# Patient Record
Sex: MALE | Race: WHITE | NOT HISPANIC OR LATINO | Employment: OTHER | URBAN - METROPOLITAN AREA
[De-identification: names, ages, dates, MRNs, and addresses within clinical notes are randomized per-mention and may not be internally consistent; named-entity substitution may affect disease eponyms.]

---

## 2023-05-31 LAB
ALANINE AMINOTRANSFERASE (SGPT) (U/L) IN SER/PLAS: 21 U/L (ref 10–52)
ALBUMIN (G/DL) IN SER/PLAS: 4.1 G/DL (ref 3.4–5)
ALKALINE PHOSPHATASE (U/L) IN SER/PLAS: 74 U/L (ref 33–136)
ANION GAP IN SER/PLAS: 10 MMOL/L (ref 10–20)
ASPARTATE AMINOTRANSFERASE (SGOT) (U/L) IN SER/PLAS: 26 U/L (ref 9–39)
BILIRUBIN TOTAL (MG/DL) IN SER/PLAS: 0.7 MG/DL (ref 0–1.2)
CALCIUM (MG/DL) IN SER/PLAS: 9.5 MG/DL (ref 8.6–10.3)
CARBON DIOXIDE, TOTAL (MMOL/L) IN SER/PLAS: 30 MMOL/L (ref 21–32)
CHLORIDE (MMOL/L) IN SER/PLAS: 106 MMOL/L (ref 98–107)
CREATININE (MG/DL) IN SER/PLAS: 1.29 MG/DL (ref 0.5–1.3)
GFR MALE: 56 ML/MIN/1.73M2
GLUCOSE (MG/DL) IN SER/PLAS: 90 MG/DL (ref 74–99)
POTASSIUM (MMOL/L) IN SER/PLAS: 4.1 MMOL/L (ref 3.5–5.3)
PROTEIN TOTAL: 7.2 G/DL (ref 6.4–8.2)
SODIUM (MMOL/L) IN SER/PLAS: 142 MMOL/L (ref 136–145)
UREA NITROGEN (MG/DL) IN SER/PLAS: 21 MG/DL (ref 6–23)

## 2023-06-09 LAB — N-TELOPEPTIDE,SERUM: 10.4 NM BCE (ref 5.4–24.2)

## 2023-07-18 ENCOUNTER — TELEPHONE (OUTPATIENT)
Dept: PRIMARY CARE | Facility: CLINIC | Age: 83
End: 2023-07-18
Payer: MEDICARE

## 2023-07-18 NOTE — TELEPHONE ENCOUNTER
Pt of Dr Casanova. Has appt on 8/8 and wife called in asking if labs were needed before. Please advise he had some labs done recently through another doctor.

## 2023-07-19 PROBLEM — R29.898 WEAKNESS OF SHOULDER: Status: ACTIVE | Noted: 2023-07-19

## 2023-07-19 PROBLEM — D12.6 ADENOMATOUS COLON POLYP: Status: ACTIVE | Noted: 2023-07-19

## 2023-07-19 PROBLEM — E78.5 DYSLIPIDEMIA: Status: ACTIVE | Noted: 2023-07-19

## 2023-07-19 PROBLEM — F41.9 ANXIOUSNESS: Status: ACTIVE | Noted: 2023-07-19

## 2023-07-19 PROBLEM — R79.89 ELEVATED SERUM CREATININE: Status: ACTIVE | Noted: 2023-07-19

## 2023-07-19 PROBLEM — C61 PROSTATE CANCER (MULTI): Status: ACTIVE | Noted: 2023-07-19

## 2023-07-19 PROBLEM — M81.0 OSTEOPOROSIS WITHOUT CURRENT PATHOLOGICAL FRACTURE: Status: ACTIVE | Noted: 2023-07-19

## 2023-07-19 PROBLEM — R60.9 EDEMA: Status: ACTIVE | Noted: 2023-07-19

## 2023-07-19 PROBLEM — N18.31 CHRONIC KIDNEY DISEASE, STAGE 3A (MULTI): Status: ACTIVE | Noted: 2023-07-19

## 2023-07-19 PROBLEM — I73.00 RAYNAUDS PHENOMENON: Status: ACTIVE | Noted: 2023-07-19

## 2023-07-19 PROBLEM — R03.0 BLOOD PRESSURE ELEVATED WITHOUT HISTORY OF HTN: Status: ACTIVE | Noted: 2023-07-19

## 2023-07-19 PROBLEM — R41.89 COGNITIVE DECLINE: Status: ACTIVE | Noted: 2023-07-19

## 2023-07-19 PROBLEM — J06.9 ACUTE UPPER RESPIRATORY INFECTION: Status: ACTIVE | Noted: 2023-07-19

## 2023-07-19 PROBLEM — M25.512 PAIN IN JOINT OF LEFT SHOULDER: Status: ACTIVE | Noted: 2023-07-19

## 2023-07-19 PROBLEM — I83.899 VARICOSE VEINS OF LEG WITH SWELLING: Status: ACTIVE | Noted: 2023-07-19

## 2023-07-19 PROBLEM — R41.3 MEMORY CHANGES: Status: ACTIVE | Noted: 2023-07-19

## 2023-07-19 PROBLEM — M85.80 OSTEOPENIA: Status: ACTIVE | Noted: 2023-07-19

## 2023-07-19 PROBLEM — L81.8: Status: ACTIVE | Noted: 2023-07-19

## 2023-07-19 PROBLEM — E03.9 HYPOTHYROIDISM: Status: ACTIVE | Noted: 2023-07-19

## 2023-07-19 PROBLEM — F03.90 DEMENTIA WITHOUT BEHAVIORAL DISTURBANCE (MULTI): Status: ACTIVE | Noted: 2023-07-19

## 2023-07-19 PROBLEM — D64.9 ANEMIA: Status: ACTIVE | Noted: 2023-07-19

## 2023-07-19 PROBLEM — Z97.3 WEARS GLASSES: Status: ACTIVE | Noted: 2023-07-19

## 2023-07-19 RX ORDER — LEVOTHYROXINE SODIUM 50 UG/1
TABLET ORAL
COMMUNITY
Start: 2017-11-23 | End: 2023-08-10

## 2023-07-19 RX ORDER — ALENDRONATE SODIUM 70 MG/1
TABLET ORAL
COMMUNITY
Start: 2021-10-29 | End: 2024-02-12 | Stop reason: WASHOUT

## 2023-07-19 RX ORDER — ASPIRIN 81 MG/1
TABLET ORAL
COMMUNITY
Start: 2018-07-19 | End: 2024-02-12 | Stop reason: ALTCHOICE

## 2023-07-19 RX ORDER — DENOSUMAB 60 MG/ML
60 INJECTION SUBCUTANEOUS
COMMUNITY
Start: 2022-08-24 | End: 2023-11-08 | Stop reason: SDUPTHER

## 2023-07-19 RX ORDER — CALCIUM CARB/VITAMIN D3/VIT K1 500-500-40
TABLET,CHEWABLE ORAL
COMMUNITY
Start: 2017-03-15

## 2023-07-19 RX ORDER — HYDROXYZINE HYDROCHLORIDE 25 MG/1
1 TABLET, FILM COATED ORAL DAILY PRN
COMMUNITY
Start: 2023-02-20 | End: 2024-02-12 | Stop reason: SDUPTHER

## 2023-07-19 RX ORDER — MULTIVITAMIN
TABLET ORAL
COMMUNITY

## 2023-07-19 RX ORDER — CALCIUM CARBONATE 600 MG
1 TABLET ORAL 2 TIMES DAILY
COMMUNITY

## 2023-07-19 RX ORDER — DONEPEZIL HYDROCHLORIDE 10 MG/1
1 TABLET, FILM COATED ORAL NIGHTLY
COMMUNITY
Start: 2021-10-27 | End: 2023-11-24

## 2023-07-19 RX ORDER — ATORVASTATIN CALCIUM 20 MG/1
1 TABLET, FILM COATED ORAL DAILY
COMMUNITY
Start: 2017-07-30 | End: 2023-12-07

## 2023-08-08 ENCOUNTER — TELEPHONE (OUTPATIENT)
Dept: PRIMARY CARE | Facility: CLINIC | Age: 83
End: 2023-08-08

## 2023-08-08 ENCOUNTER — OFFICE VISIT (OUTPATIENT)
Dept: PRIMARY CARE | Facility: CLINIC | Age: 83
End: 2023-08-08
Payer: MEDICARE

## 2023-08-08 DIAGNOSIS — E03.9 ACQUIRED HYPOTHYROIDISM: Chronic | ICD-10-CM

## 2023-08-08 DIAGNOSIS — N18.31 CHRONIC KIDNEY DISEASE, STAGE 3A (MULTI): Chronic | ICD-10-CM

## 2023-08-08 DIAGNOSIS — C61 PROSTATE CANCER (MULTI): Chronic | ICD-10-CM

## 2023-08-08 DIAGNOSIS — E78.5 DYSLIPIDEMIA: Chronic | ICD-10-CM

## 2023-08-08 DIAGNOSIS — F03.B4 MODERATE DEMENTIA WITH ANXIETY, UNSPECIFIED DEMENTIA TYPE (MULTI): Chronic | ICD-10-CM

## 2023-08-08 DIAGNOSIS — D64.9 ANEMIA, UNSPECIFIED TYPE: Chronic | ICD-10-CM

## 2023-08-08 DIAGNOSIS — R03.0 BLOOD PRESSURE ELEVATED WITHOUT HISTORY OF HTN: Primary | Chronic | ICD-10-CM

## 2023-08-08 PROBLEM — R79.89 ELEVATED SERUM CREATININE: Status: RESOLVED | Noted: 2023-07-19 | Resolved: 2023-08-08

## 2023-08-08 PROCEDURE — 1126F AMNT PAIN NOTED NONE PRSNT: CPT | Performed by: INTERNAL MEDICINE

## 2023-08-08 PROCEDURE — 1160F RVW MEDS BY RX/DR IN RCRD: CPT | Performed by: INTERNAL MEDICINE

## 2023-08-08 PROCEDURE — 1036F TOBACCO NON-USER: CPT | Performed by: INTERNAL MEDICINE

## 2023-08-08 PROCEDURE — 99215 OFFICE O/P EST HI 40 MIN: CPT | Performed by: INTERNAL MEDICINE

## 2023-08-08 PROCEDURE — 1159F MED LIST DOCD IN RCRD: CPT | Performed by: INTERNAL MEDICINE

## 2023-08-08 RX ORDER — ESCITALOPRAM OXALATE 5 MG/1
5 TABLET ORAL DAILY
COMMUNITY
End: 2023-10-09 | Stop reason: SDUPTHER

## 2023-08-08 ASSESSMENT — ENCOUNTER SYMPTOMS
OCCASIONAL FEELINGS OF UNSTEADINESS: 0
LOSS OF SENSATION IN FEET: 0
DEPRESSION: 0

## 2023-08-08 ASSESSMENT — PATIENT HEALTH QUESTIONNAIRE - PHQ9
1. LITTLE INTEREST OR PLEASURE IN DOING THINGS: NOT AT ALL
2. FEELING DOWN, DEPRESSED OR HOPELESS: NOT AT ALL
SUM OF ALL RESPONSES TO PHQ9 QUESTIONS 1 AND 2: 0

## 2023-08-08 NOTE — PROGRESS NOTES
Subjective   Patient ID: Randy Lewis is a 82 y.o. male who presents for Follow-up.    Here with his wife Danitza and son Gene    For the most part doing well.  He has had issues with anxiety-with a panic attack mid February before relocating towards the [facility] in [place].  He had another near episode earlier this summer before golfing as it has been a bit difficult getting out with some of his golf friends    Hydroxyzine has been provided which has helped with his panic attacks but recurrent anxiety with his memory loss remains an issue.  His family wonders about daily Lexapro as suggested by his neurologist who we recently saw last week    He still drives.  He has not yet set up a driving evaluation.  He recently got lost driving, and had to get help to find directions to get home    He remains active, swimming daily.  He enjoys music in his room in his basement, listening to music and making music             Review of Systems    Objective   /72 (BP Location: Left arm, Patient Position: Sitting, BP Cuff Size: Adult)   Pulse 65   Temp 36.5 °C (97.7 °F)   Resp 16   Wt 69.5 kg (153 lb 3.2 oz)   SpO2 99%   BMI 22.96 kg/m²     Physical Exam  Vitals reviewed.   Constitutional:       Appearance: Normal appearance.      Comments: Very well-dressed elderly gentleman, very polite rather soft-spoken answering questions when directed to but otherwise remaining quiet, listening to the conversation.  No abnormal thoughts   HENT:      Head: Normocephalic and atraumatic.   Eyes:      General: No scleral icterus.        Right eye: No discharge.         Left eye: No discharge.      Extraocular Movements: Extraocular movements intact.      Conjunctiva/sclera: Conjunctivae normal.      Pupils: Pupils are equal, round, and reactive to light.   Cardiovascular:      Rate and Rhythm: Normal rate and regular rhythm.      Pulses: Normal pulses.      Heart sounds: Normal heart sounds. No murmur  heard.  Pulmonary:      Effort: Pulmonary effort is normal.      Breath sounds: Normal breath sounds. No wheezing or rhonchi.   Musculoskeletal:         General: No deformity or signs of injury. Normal range of motion.      Cervical back: Normal range of motion and neck supple. No rigidity or tenderness.   Lymphadenopathy:      Cervical: No cervical adenopathy.   Skin:     General: Skin is warm and dry.      Findings: No rash.   Neurological:      General: No focal deficit present.      Mental Status: He is alert and oriented to person, place, and time. Mental status is at baseline.      Cranial Nerves: No cranial nerve deficit.      Sensory: No sensory deficit.      Gait: Gait normal.   Psychiatric:         Mood and Affect: Mood normal.         Behavior: Behavior normal.         Thought Content: Thought content normal.         Assessment/Plan   Problem List Items Addressed This Visit       Anemia    Relevant Orders    CBC    Blood pressure elevated without history of HTN - Primary    Chronic kidney disease, stage 3a (CMS/HCC)    Relevant Orders    Basic Metabolic Panel    Dyslipidemia    Hypothyroidism    Relevant Orders    TSH with reflex to Free T4 if abnormal    Prostate cancer (CMS/HCC)            We spoke over 40 minutes, over half the time counseling regarding his advancing memory concerns and driving concerns    Care planning-his wife Danitza and his son Gene, who lives locally was in attendance    Living situation-he lives with his wife, since February 2023 at the [email or web address] in [place], and a senior facility with exercise facilities available as well as various activities with others in different groups with activities there.  They live in a one-story condo, with a basement where he has a room where he enjoys his music.  He still drives    Dementia with anxiety and panic attacks-with memory concerns slowly advancing over the last several years.   Neuro psych testing per Dr. Brunson Oct '20 confirmed  this. She recommended multiple things, none of which have been done now 5 months out.  At that time, wrote out a list for him to pursue including 1. Neurology consultation 2. Elderly dementia assessment at the [facility]- 3. MRI 4. Occupational driving assessment. After Oct '21 f/u appt w/ Dr. Soto, Aricept started. His wife feels things are a bit better, in part from his concentrating a bit more, and her humor w/ memory lapses. She avoids correcting him in public. He uses [employer] web site and plays his guitar and sings 2 hrs daily.        8/23-memory loss has progressed somewhat, with behavioral symptoms mainly anxiousness and panic attacks with stressful situations.  They visited with Dr. Soto last week-who suggested low-dose Lexapro along with the hydroxyzine.  We spoke at length regarding the as needed hydroxyzine plan, and considering a 12-week trial of the daily Lexapro to help as a maintenance medicine to help with anxiousness.  Up to this point he has been reluctant to consider the low-dose daily Lexapro but states he will try this for 8 to 12 weeks.  He will continue his exercise routine with swimming daily and time with his music in his basement office room, where he enjoys researching and singing music          Driving concerns-he has had a stellar driving record he states. He has not had any accidents and states he remains quite safe. Nevertheless I recommended avoiding driving at nighttime or on the freeway or in unfamiliar locations. I have spoken with his wife and recommended that she ride with him at least once every other week or so to confirm that he is driving remains safe   His wife has written with him and continues to assert that he remains a safe . I asked her to continue to do this every other trip or so when he is driving to continue the ongoing assessment   His wife drives w/ him and states things are quite well.           8/23-he still drives alone, and has not yet had his  's evaluation as ordered last time.  His wife states that she is not ready for him not to be a  and plans to work to get his driving evaluation and Occupational Therapy scheduled.  Concerning is that he recently got lost driving locally and had to ask a stranger directions to get home.  Though his location is always known by his wife and son Gene on their cell phones, I told them that I do not think it is safe for him to be driving and I do not think he should be driving any longer with his loss of sense of directions and getting lost.  I encouraged him to exchange his 's license at the [facility] for a state ID card.     Elevated blood pressure-we will continue to follow this regularly. His father did not have high blood pressure though his mother did have a stroke. He understands with his borderline elevated pressures at times, it's quite likely he will need blood pressure medicine down the road. We'll continue to reassess each visit. He will continue his I'll the lifestyle efforts including exercising most the time at home and a healthy diet towards a goal of maintaining his weight in the ideal range as he has done. Blood pressure improved on recheck.   Blood pressure remains borderline. Blood pressure improved at home. Home blood pressure check has been brought in and is fairly accurate. They will continue to follow and notify me if over 130.              Blood pressure improved today on recheck but they are not following this at home. Encourage them to follow this     Mild chronic kidney disease-stage IIIA- stable on August 2021 labs-we will continue to follow     Dyslipidemia/elevated 10 year cardiac risk assessment- tolerating additional atorvastatin & baby aspirin      Exercise routine-he now has a swimming pool both indoor and outdoor at his [facility] and he swims 1/2-hour daily     Abnormal EKG- stress echo unremarkable 8/18. No active cardiac symptoms     Carotid bruit concerns/family  history of CVA- mother- carotid duplex unremarkable July '19.     Hypothyroidism - he'll cont thyroid supplement & thyroid labs at least semiannually. TSH elevated 1/22. Ordered 8/22.     Medication concerns-I suggested a pill minder and work to have all of his pills taken by lunchtime.     Prostate cancer 2006- we'll continue annual PSA each summer. He no longer sees Dr. Langston. PSA to be followed annually.    No present urology symptoms. Most recent PSA August 2022 remains undetectable.              Annual PSA ordered     Family history of colon cancer-he will continue a colonoscopy every 3 years    updated per Dr. Martinez April 2019. Last in 4/22. Now on 5 year surveillance - next in 4/27.     Osteopenia- he will continue calcium and vitamin D under the direction of his rheumatologist- Fosamax started Oct '21, after Sept '21 DEXA was a bit worse; Doing well each Saturday morning taking this.    transition to Dr. Barnard in 8/22 to determine if he will require shots or continue with Fosamax.            Last visit Mar '23.  Presently on Prolia-with infusion 6/23, along with blood work.  Anticipate next DEXA in 2024.     Positive MAIK/Raynaud's- His Raynaud's not problematic this winter.  He will continue with rheumatology care     Left shoulder pain - s/p injury w/ overuse fall '18. improved w/ therapy. no longer on meloxicam. Dr. Douglas helped left shoulder w/ yard work.     Mid back pain - onset approximately late 7/22 after lifting a planters' urn. Currently improved. Encouraged heating and stretching for continued relief. Strongly encouraged caution with such risk-taking behavior     Congestion / epistaxis - Flonase helpful. bleeding mainly a winter issue. encouraged nasal saline, and ENT f/u w/ concerns     Chronic edema/spider veins/stasis pigmentation/Right tibial varicose vein-should add noted distal anterior tibial area. compression hose encouraged w/ travel and extended travel. He will continue salt  restriction. He may see Dr. Kelley in Derm surgery. RE options. Asymptomatic        Right ankle eczema / Sun exposure history-he will continue sunscreen especially sailing, a and follow-up with derm. ordered; he uses SPF 50 or 100 on the water. Using moisturizer to right ankle following right ankle treatment recently. Skin Care concerns - encouraged skin care, sun screen when outdoors, and follow up w/ dermatology w/ any concerning skin changes. He now sees Dr. Salcedo annually     Skin care - he'll cont. sun screen, and see Dr. Salcedo annually. Will setup appt soon for routine check up.     Tinnitus - improved w/ his music. He wears ear protection     Glasses/family history of cataracts-he will continue annual visits with his eye doctor- Dr. Christiansen annually      Early cataracts / reading glasses / vision care / retinal freckle - annually exams cont.     Dental care - semiannually- Dr. Perez - [facility]        Family concerns - daughter in law in [place] had colectomy for colon cancer surgery. in 2020. Doing better           Their son, along w/ his family, visited from [place] earlier this summer.  They hosted the 7 in their new adicate timeadso.     Travel plans-his wife is booked a cruise in February, along with 2 nieces and their husbands.         High dose flu shot encouraged annually after Labor Day- updated 9/22.      Shingrix series completed     Covid series completed 3/21. Covid Booster completed. Additional booster shot updated 10/22, encouraged another booster in October     Follow-up in 5 months, sooner as needed     Charting was completed using voice recognition technology and may include unintended errors.

## 2023-08-08 NOTE — TELEPHONE ENCOUNTER
Pt of Dr Casanova. Was just in for a visit and wife was asking about a contact number to have Randy take a driving test to still drive with his memory concerns. Not sure if this was addressed at visit. Please advise.

## 2023-08-09 VITALS
RESPIRATION RATE: 16 BRPM | SYSTOLIC BLOOD PRESSURE: 128 MMHG | BODY MASS INDEX: 22.96 KG/M2 | WEIGHT: 153.2 LBS | OXYGEN SATURATION: 99 % | DIASTOLIC BLOOD PRESSURE: 80 MMHG | TEMPERATURE: 97.7 F | HEART RATE: 65 BPM

## 2023-08-09 PROBLEM — F03.90 DEMENTIA WITHOUT BEHAVIORAL DISTURBANCE (MULTI): Status: RESOLVED | Noted: 2023-07-19 | Resolved: 2023-08-09

## 2023-08-09 PROBLEM — F03.B4: Status: ACTIVE | Noted: 2023-07-19

## 2023-08-09 PROBLEM — R41.3 MEMORY CHANGES: Status: RESOLVED | Noted: 2023-07-19 | Resolved: 2023-08-09

## 2023-08-10 DIAGNOSIS — E03.9 ACQUIRED HYPOTHYROIDISM: Primary | ICD-10-CM

## 2023-08-10 RX ORDER — LEVOTHYROXINE SODIUM 50 UG/1
50 TABLET ORAL
Qty: 90 TABLET | Refills: 1 | Status: SHIPPED | OUTPATIENT
Start: 2023-08-10 | End: 2024-04-29 | Stop reason: SDUPTHER

## 2023-08-28 ENCOUNTER — TELEPHONE (OUTPATIENT)
Dept: PRIMARY CARE | Facility: CLINIC | Age: 83
End: 2023-08-28
Payer: MEDICARE

## 2023-08-28 NOTE — TELEPHONE ENCOUNTER
Please call pt's spouse regarding her husbands account she can't get into her husbands [employer] account. She has already called the help desk and they were unable to help her. She advised he has rachelia. Please call to see what she can do

## 2023-08-28 NOTE — TELEPHONE ENCOUNTER
Patient walked into office for assistance. We updated email as it was incorrect. Patient also had two charts which we marked for merge. She will call [employer] support for password reset.

## 2023-10-09 ENCOUNTER — TELEPHONE (OUTPATIENT)
Dept: NEUROLOGY | Facility: CLINIC | Age: 83
End: 2023-10-09
Payer: MEDICARE

## 2023-10-09 DIAGNOSIS — F41.9 ANXIETY: Primary | ICD-10-CM

## 2023-10-09 RX ORDER — ESCITALOPRAM OXALATE 5 MG/1
5 TABLET ORAL DAILY
Qty: 90 TABLET | Refills: 3 | Status: SHIPPED | OUTPATIENT
Start: 2023-10-09 | End: 2024-10-08

## 2023-10-09 NOTE — TELEPHONE ENCOUNTER
Patient spouse called in requesting to change rx for Escitalopram Oxalate-instead of a 30 day supply, they would like a 90 day supply. Please send to [facility]. Thanks!

## 2023-11-08 DIAGNOSIS — M85.80 OSTEOPENIA, UNSPECIFIED LOCATION: Primary | ICD-10-CM

## 2023-11-08 RX ORDER — EPINEPHRINE 0.3 MG/.3ML
0.3 INJECTION SUBCUTANEOUS EVERY 5 MIN PRN
Status: CANCELLED | OUTPATIENT
Start: 2023-12-05

## 2023-11-08 RX ORDER — ALBUTEROL SULFATE 0.83 MG/ML
3 SOLUTION RESPIRATORY (INHALATION) AS NEEDED
Status: CANCELLED | OUTPATIENT
Start: 2023-12-05

## 2023-11-08 RX ORDER — DIPHENHYDRAMINE HYDROCHLORIDE 50 MG/ML
50 INJECTION INTRAMUSCULAR; INTRAVENOUS AS NEEDED
Status: CANCELLED | OUTPATIENT
Start: 2023-12-05

## 2023-11-08 RX ORDER — FAMOTIDINE 10 MG/ML
20 INJECTION INTRAVENOUS ONCE AS NEEDED
Status: CANCELLED | OUTPATIENT
Start: 2023-12-05

## 2023-11-08 NOTE — PROGRESS NOTES
Continuation of therapy - Prolia 60mg q 6 months. [facility] at [facility] on 12/5/23.

## 2023-11-22 ENCOUNTER — LAB (OUTPATIENT)
Dept: LAB | Facility: LAB | Age: 83
End: 2023-11-22
Payer: MEDICARE

## 2023-11-22 DIAGNOSIS — E03.9 ACQUIRED HYPOTHYROIDISM: ICD-10-CM

## 2023-11-22 DIAGNOSIS — M85.80 OSTEOPENIA, UNSPECIFIED LOCATION: ICD-10-CM

## 2023-11-22 DIAGNOSIS — D64.9 ANEMIA, UNSPECIFIED TYPE: Chronic | ICD-10-CM

## 2023-11-22 DIAGNOSIS — N18.31 CHRONIC KIDNEY DISEASE, STAGE 3A (MULTI): Chronic | ICD-10-CM

## 2023-11-22 DIAGNOSIS — C61 PROSTATE CANCER (MULTI): Chronic | ICD-10-CM

## 2023-11-22 LAB
ALBUMIN SERPL BCP-MCNC: 4 G/DL (ref 3.4–5)
ALP SERPL-CCNC: 78 U/L (ref 33–136)
ALT SERPL W P-5'-P-CCNC: 26 U/L (ref 10–52)
ANION GAP SERPL CALC-SCNC: 11 MMOL/L (ref 10–20)
AST SERPL W P-5'-P-CCNC: 28 U/L (ref 9–39)
BILIRUB SERPL-MCNC: 1 MG/DL (ref 0–1.2)
BUN SERPL-MCNC: 23 MG/DL (ref 6–23)
CALCIUM SERPL-MCNC: 9.3 MG/DL (ref 8.6–10.3)
CHLORIDE SERPL-SCNC: 102 MMOL/L (ref 98–107)
CO2 SERPL-SCNC: 30 MMOL/L (ref 21–32)
CREAT SERPL-MCNC: 1.3 MG/DL (ref 0.5–1.3)
ERYTHROCYTE [DISTWIDTH] IN BLOOD BY AUTOMATED COUNT: 13.9 % (ref 11.5–14.5)
GFR SERPL CREATININE-BSD FRML MDRD: 55 ML/MIN/1.73M*2
GLUCOSE SERPL-MCNC: 96 MG/DL (ref 74–99)
HCT VFR BLD AUTO: 50.7 % (ref 41–52)
HGB BLD-MCNC: 16.4 G/DL (ref 13.5–17.5)
MCH RBC QN AUTO: 28.8 PG (ref 26–34)
MCHC RBC AUTO-ENTMCNC: 32.3 G/DL (ref 32–36)
MCV RBC AUTO: 89 FL (ref 80–100)
NRBC BLD-RTO: 0 /100 WBCS (ref 0–0)
PLATELET # BLD AUTO: 176 X10*3/UL (ref 150–450)
POTASSIUM SERPL-SCNC: 4.2 MMOL/L (ref 3.5–5.3)
PROT SERPL-MCNC: 7 G/DL (ref 6.4–8.2)
PSA SERPL-MCNC: <0.01 NG/ML
RBC # BLD AUTO: 5.69 X10*6/UL (ref 4.5–5.9)
SODIUM SERPL-SCNC: 139 MMOL/L (ref 136–145)
T4 FREE SERPL-MCNC: 0.94 NG/DL (ref 0.61–1.12)
TSH SERPL-ACNC: 4.64 MIU/L (ref 0.44–3.98)
WBC # BLD AUTO: 7.2 X10*3/UL (ref 4.4–11.3)

## 2023-11-22 PROCEDURE — 84153 ASSAY OF PSA TOTAL: CPT

## 2023-11-22 PROCEDURE — 84443 ASSAY THYROID STIM HORMONE: CPT

## 2023-11-22 PROCEDURE — 84439 ASSAY OF FREE THYROXINE: CPT

## 2023-11-22 PROCEDURE — 80053 COMPREHEN METABOLIC PANEL: CPT

## 2023-11-22 PROCEDURE — 36415 COLL VENOUS BLD VENIPUNCTURE: CPT

## 2023-11-22 PROCEDURE — 85027 COMPLETE CBC AUTOMATED: CPT

## 2023-11-23 DIAGNOSIS — R41.3 MEMORY LOSS: Primary | ICD-10-CM

## 2023-11-24 RX ORDER — DONEPEZIL HYDROCHLORIDE 10 MG/1
TABLET, FILM COATED ORAL
Qty: 90 TABLET | Refills: 3 | Status: SHIPPED | OUTPATIENT
Start: 2023-11-24

## 2023-11-24 NOTE — RESULT ENCOUNTER NOTE
Bill    Thanks for doing the labs.  I am glad that the kidney and liver labs look fine as to the electrolytes.  The prostate blood test remains undetectable which is good.    One  thyroid lab, called the TSH is slightly elevated, but fortunately the other thyroid lab called the free thyroxine is normal.  This means that you are on sufficient thyroid medication at this time.  Will continue to follow this down the road.    At this point no additional blood work is needed.    Wishing you and your family the very best this holiday season.    Sincerely,  Miguel A Casanova MD

## 2023-11-29 ENCOUNTER — TELEPHONE (OUTPATIENT)
Dept: PRIMARY CARE | Facility: CLINIC | Age: 83
End: 2023-11-29
Payer: MEDICARE

## 2023-11-29 DIAGNOSIS — F03.B4 MODERATE DEMENTIA WITH ANXIETY, UNSPECIFIED DEMENTIA TYPE (MULTI): Primary | ICD-10-CM

## 2023-12-05 ENCOUNTER — APPOINTMENT (OUTPATIENT)
Dept: INFUSION THERAPY | Facility: CLINIC | Age: 83
End: 2023-12-05
Payer: MEDICARE

## 2023-12-06 ENCOUNTER — INFUSION (OUTPATIENT)
Dept: INFUSION THERAPY | Facility: CLINIC | Age: 83
End: 2023-12-06
Payer: MEDICARE

## 2023-12-06 VITALS
OXYGEN SATURATION: 98 % | HEART RATE: 56 BPM | DIASTOLIC BLOOD PRESSURE: 80 MMHG | TEMPERATURE: 97.9 F | SYSTOLIC BLOOD PRESSURE: 155 MMHG | RESPIRATION RATE: 18 BRPM

## 2023-12-06 DIAGNOSIS — M85.80 OSTEOPENIA, UNSPECIFIED LOCATION: ICD-10-CM

## 2023-12-06 PROCEDURE — 96372 THER/PROPH/DIAG INJ SC/IM: CPT | Performed by: REGISTERED NURSE

## 2023-12-06 RX ORDER — FAMOTIDINE 10 MG/ML
20 INJECTION INTRAVENOUS ONCE AS NEEDED
Status: CANCELLED | OUTPATIENT
Start: 2024-05-20

## 2023-12-06 RX ORDER — DIPHENHYDRAMINE HYDROCHLORIDE 50 MG/ML
50 INJECTION INTRAMUSCULAR; INTRAVENOUS AS NEEDED
Status: CANCELLED | OUTPATIENT
Start: 2024-05-20

## 2023-12-06 RX ORDER — ALBUTEROL SULFATE 0.83 MG/ML
3 SOLUTION RESPIRATORY (INHALATION) AS NEEDED
Status: CANCELLED | OUTPATIENT
Start: 2024-05-20

## 2023-12-06 RX ORDER — EPINEPHRINE 0.3 MG/.3ML
0.3 INJECTION SUBCUTANEOUS EVERY 5 MIN PRN
Status: CANCELLED | OUTPATIENT
Start: 2024-05-20

## 2023-12-06 NOTE — PATIENT INSTRUCTIONS
Today you received:     PROLIA 60 MG subcutaneous INJECTION.    NEXT APPT 6 MONTHS. PLEASE HAVE CALCIUM DRAWN WITHIN 3 WEEKS PRIOR TO NEXT APPT.     For:    1. Osteopenia, unspecified location            Please read the  Medication Guide that was given to you and reviewed during todays visit.     (Tell all doctors including dentists that you are taking this medication)     Go to the emergency room or call 911 if:  -You have signs of allergic reaction:   o         Rash, hives, itching.   o         Swollen, blistered, peeling skin.   o         Swelling of face, lips, mouth, tongue or throat.   o         Tightness of chest, trouble breathing, swallowing or talking      Call your doctor:     - If IV / injection site gets red, warm, swollen, itchy or leaks fluid or pus.     (Leave dressing on your IV site for at least 2 hours and keep area clean and dry    - If you get sick or have symptoms of infection or are not feeling well for any reason.    (Wash your hands often, stay away from people who are sick)    - If you have side effects from your medication that do not go away or are bothersome.     (Refer to the teaching your nurse gave you for side effects to call your doctor about)     Common side effects may include:  stuffy nose, headache, feeling tired, muscle aches, upset stomach    - Before receiving any vaccines, Call the [facility] at [phone]     - You get sick, are on antibiotics, have had a recent vaccine, have surgery or dental work and your doctor wants your visit rescheduled.    - You need to cancel and reschedule your visit for any reason. Call at least 2 days before your visit if you need to cancel.     - Your insurance changes before your next visit.    (We will need to get approval from your new insurance. This can take up to two weeks.)     The [facility] is opened Monday thru Friday 8am-8pm and Saturday 8am-4:30pm. We are closed on holidays.     Voice mail will  take your call if the center is closed. If you leave a message please allow 24 hours for a call back during weekdays. If you leave a message on a weekend/holiday, we will call you back the next business day.

## 2023-12-07 DIAGNOSIS — E78.5 DYSLIPIDEMIA: Primary | ICD-10-CM

## 2023-12-07 RX ORDER — ATORVASTATIN CALCIUM 20 MG/1
20 TABLET, FILM COATED ORAL DAILY
Qty: 90 TABLET | Refills: 3 | Status: SHIPPED | OUTPATIENT
Start: 2023-12-07 | End: 2023-12-08 | Stop reason: SDUPTHER

## 2023-12-08 DIAGNOSIS — E78.5 DYSLIPIDEMIA: ICD-10-CM

## 2023-12-09 RX ORDER — ATORVASTATIN CALCIUM 20 MG/1
20 TABLET, FILM COATED ORAL DAILY
Qty: 90 TABLET | Refills: 3 | Status: SHIPPED | OUTPATIENT
Start: 2023-12-09 | End: 2024-12-08

## 2023-12-12 ENCOUNTER — APPOINTMENT (OUTPATIENT)
Dept: DERMATOLOGY | Facility: CLINIC | Age: 83
End: 2023-12-12
Payer: MEDICARE

## 2024-01-03 ENCOUNTER — EVALUATION (OUTPATIENT)
Dept: OCCUPATIONAL THERAPY | Facility: CLINIC | Age: 84
End: 2024-01-03
Payer: MEDICARE

## 2024-01-03 DIAGNOSIS — Z91.89 DRIVING SAFETY ISSUE: ICD-10-CM

## 2024-01-03 DIAGNOSIS — F03.B4 MODERATE DEMENTIA WITH ANXIETY, UNSPECIFIED DEMENTIA TYPE (MULTI): ICD-10-CM

## 2024-01-03 DIAGNOSIS — R41.3 MEMORY CHANGES: Primary | ICD-10-CM

## 2024-01-03 PROCEDURE — 97530 THERAPEUTIC ACTIVITIES: CPT | Mod: GO | Performed by: OCCUPATIONAL THERAPIST

## 2024-01-03 PROCEDURE — 97166 OT EVAL MOD COMPLEX 45 MIN: CPT | Mod: GO | Performed by: OCCUPATIONAL THERAPIST

## 2024-01-03 NOTE — PROGRESS NOTES
"Occupational Therapy    1. Memory changes        2. Moderate dementia with anxiety, unspecified dementia type (CMS/HCC)  Referral to Occupational Therapy      3. Driving safety issue             Patient referred to OT by physician to determine independence with living skills and community mobility due to memory changes.     Summary/Recommendations: After completing this comprehensive assessment, it is recommended that patient the cease driving. This is due the patient's difficulty learning novel tasks, executive functioning, sequencing, problem solving and memory deficits.  Additionally, patient demonstrated decreased judgment and safety awareness  time while on the  simulator. Due to the complexity of driving, patient could pose a risk to self and others should he continue driving. If wanting to pursue driving further, patient could pursue and \"on the road\"  assessment, which is the gold standard of  evaluations.      Past Medical History: See medical history form      Cognitive Assessment (Memory/Attention/Flexibility/Processing):  Completed Milad Cognitive Assessment (MOCA).  Patient is not orientated to date and year. Patient's score is 15/30 which indicates moderate cognitive impairment. Patient with noted deficits on the following subtests: executive functioning, memory, language, attention, and delayed recall. (Recalled 0/5 words).   (Severity ranges for MOCA 26-30 normal, 18-25 Mild Cognitive Impairment, 10-17 Moderate Cognitive Impairment, 10 or less Severe Cognitive Impairment)     Range of Motion/Strength:  Patient demonstrates good UE and LE ROM.  Neck ROM for driving was WFL bilaterally.  Strength as assessed through MMT in UE/LE's is WFL.       Self Care / ADLs: Patient reports independence with ADLs.      IADLs: Patient lives with spouse; spouse assists with transportation     Functional Mobility: Independent without assistive device.      Balance: Patient reports no falls within " "the last six months      Pain: Patient with no complaints of pain on today's date.      Vision:  Patient demonstrates good environmental scanning and ocular ROM.  Peripheral vision, pursuits and convergence were within normal limits.     Patient wears prescription eyewear while driving.      Visual scanning: On an organized array crowded design search, patient identified 16/17 stimuli in 2 minutes 46 seconds. Score is deficient.      Executive Functioning:  Patient completed Trail Making Tests A and B; on [place] A, patient completed 52 seconds. On [place] B task, patient completed task in 6 minutes 59 seconds.  Patient required significant verbal cues for completion of trail B; despite verbal cues patient with many errors in test.      [place] A is WNL and [place] B is DEFICIENT      ([place]: Trail A Average is 29 seconds with deficient if > 54 seconds; Trail B average is 75 seconds with deficient if >150-180 seconds.)    Substitution: Patient answered 27/28 answers correctly in 2 minutes 47 seconds on substitution test.     Driving History:  Patient is a questionable historian.  Patient reports he drives to mostly local destinations during both day and night time. Patient reports he has had several incidents of getting lost while driving.  Patient reports no recent accidents or violations      Community Mobility Tasks:  On “Am I a Safe  Questionnaire” patient identified 4 statements applicable to patient  -\"My friends and family members are worried about my driving\"   -\"I get lost while driving\"      Reaction Time Testing: Reaction timing was completed using the reaction time application on the [employer]  simulator; on five attempts, patient's average reaction time between seeing stop sign and pressing brake was .443 seconds.  Average for age and gender is approximately 0.5375 seconds with range going from .4 to .73 seconds. Patient required verbal cues for correct sequencing of task and was noted to utilize both " feet on the simulator.         Simulator Test: Patient was provided with instruction on using accelerator and brake pedal just as he would in his own car.  Location of turn signal, rearview mirror, speedometer and RPMs also were reviewed.         Patient had mild difficulty assimilating to the computer based testing model.      Patient completed  simulations with mild difficulty. Patient noted to swerve between lanes intermittently during the simulations.  Patient with no collisions throughout duration of  evaluation.  This patient appeared had decreased judgment, safety awareness, was distractible and required intermittent verbal cues throughout.     It should be noted that this is not an on-the-road assessment and this report only accounts for the date and time spent with client.             Thank you for the referral.  Please contact me should you have any questions.     Goals:  No OT goals at this time;  evaluation only

## 2024-01-18 ENCOUNTER — APPOINTMENT (OUTPATIENT)
Dept: PRIMARY CARE | Facility: CLINIC | Age: 84
End: 2024-01-18
Payer: MEDICARE

## 2024-01-18 ENCOUNTER — TELEPHONE (OUTPATIENT)
Dept: PRIMARY CARE | Facility: CLINIC | Age: 84
End: 2024-01-18

## 2024-01-18 NOTE — TELEPHONE ENCOUNTER
Spouse had left a BMV form for me to complete for him to resume driving    This will need to be filled out by his neurologist, Dr. Soto    Form was left at the  for her to .    Called-not there

## 2024-01-19 NOTE — TELEPHONE ENCOUNTER
Spoke with spouse at length.  Reviewed the occupational driving simulator assessment from earlier this month.  The occupational therapist recommended that he cease driving.  His wife will take the BMV form to his neurologist.  Once again I reiterated my opinion that he should not be driving.  They will follow-up here as scheduled in early February.  She will call with additional concerns.

## 2024-02-12 ENCOUNTER — TELEPHONE (OUTPATIENT)
Dept: PRIMARY CARE | Facility: CLINIC | Age: 84
End: 2024-02-12

## 2024-02-12 ENCOUNTER — OFFICE VISIT (OUTPATIENT)
Dept: PRIMARY CARE | Facility: CLINIC | Age: 84
End: 2024-02-12
Payer: MEDICARE

## 2024-02-12 VITALS
WEIGHT: 166.6 LBS | BODY MASS INDEX: 24.68 KG/M2 | OXYGEN SATURATION: 97 % | TEMPERATURE: 97.9 F | DIASTOLIC BLOOD PRESSURE: 78 MMHG | RESPIRATION RATE: 16 BRPM | HEIGHT: 69 IN | HEART RATE: 55 BPM | SYSTOLIC BLOOD PRESSURE: 152 MMHG

## 2024-02-12 DIAGNOSIS — G30.9 MODERATE ALZHEIMER'S DEMENTIA, UNSPECIFIED TIMING OF DEMENTIA ONSET, UNSPECIFIED WHETHER BEHAVIORAL, PSYCHOTIC, OR MOOD DISTURBANCE OR ANXIETY (MULTI): ICD-10-CM

## 2024-02-12 DIAGNOSIS — F03.B4 MODERATE DEMENTIA WITH ANXIETY, UNSPECIFIED DEMENTIA TYPE (MULTI): ICD-10-CM

## 2024-02-12 DIAGNOSIS — N18.31 CHRONIC KIDNEY DISEASE, STAGE 3A (MULTI): ICD-10-CM

## 2024-02-12 DIAGNOSIS — E78.5 DYSLIPIDEMIA: ICD-10-CM

## 2024-02-12 DIAGNOSIS — R03.0 BLOOD PRESSURE ELEVATED WITHOUT HISTORY OF HTN: ICD-10-CM

## 2024-02-12 DIAGNOSIS — Z00.00 ROUTINE GENERAL MEDICAL EXAMINATION AT HEALTH CARE FACILITY: Primary | ICD-10-CM

## 2024-02-12 DIAGNOSIS — C61 PROSTATE CANCER (MULTI): ICD-10-CM

## 2024-02-12 DIAGNOSIS — F02.B0 MODERATE ALZHEIMER'S DEMENTIA, UNSPECIFIED TIMING OF DEMENTIA ONSET, UNSPECIFIED WHETHER BEHAVIORAL, PSYCHOTIC, OR MOOD DISTURBANCE OR ANXIETY (MULTI): ICD-10-CM

## 2024-02-12 DIAGNOSIS — I10 ESSENTIAL HYPERTENSION WITH GOAL BLOOD PRESSURE LESS THAN 130/85: ICD-10-CM

## 2024-02-12 DIAGNOSIS — R41.3 MEMORY CHANGES: ICD-10-CM

## 2024-02-12 DIAGNOSIS — E03.9 ACQUIRED HYPOTHYROIDISM: ICD-10-CM

## 2024-02-12 PROBLEM — D31.32 CHOROIDAL NEVUS OF LEFT EYE: Status: ACTIVE | Noted: 2023-10-27

## 2024-02-12 PROBLEM — R61 GENERALIZED HYPERHIDROSIS: Status: ACTIVE | Noted: 2023-02-16

## 2024-02-12 PROBLEM — H25.13 AGE-RELATED NUCLEAR CATARACT OF BOTH EYES: Status: ACTIVE | Noted: 2023-10-27

## 2024-02-12 PROBLEM — H43.21 ASTEROID HYALOSIS OF RIGHT EYE: Status: ACTIVE | Noted: 2023-10-27

## 2024-02-12 PROBLEM — R42 DIZZINESS AND GIDDINESS: Status: ACTIVE | Noted: 2023-02-16

## 2024-02-12 PROCEDURE — 1159F MED LIST DOCD IN RCRD: CPT | Performed by: INTERNAL MEDICINE

## 2024-02-12 PROCEDURE — 99214 OFFICE O/P EST MOD 30 MIN: CPT | Performed by: INTERNAL MEDICINE

## 2024-02-12 PROCEDURE — 1126F AMNT PAIN NOTED NONE PRSNT: CPT | Performed by: INTERNAL MEDICINE

## 2024-02-12 PROCEDURE — 1123F ACP DISCUSS/DSCN MKR DOCD: CPT | Performed by: INTERNAL MEDICINE

## 2024-02-12 PROCEDURE — 1036F TOBACCO NON-USER: CPT | Performed by: INTERNAL MEDICINE

## 2024-02-12 PROCEDURE — 3078F DIAST BP <80 MM HG: CPT | Performed by: INTERNAL MEDICINE

## 2024-02-12 PROCEDURE — 1160F RVW MEDS BY RX/DR IN RCRD: CPT | Performed by: INTERNAL MEDICINE

## 2024-02-12 PROCEDURE — 1157F ADVNC CARE PLAN IN RCRD: CPT | Performed by: INTERNAL MEDICINE

## 2024-02-12 PROCEDURE — G0439 PPPS, SUBSEQ VISIT: HCPCS | Performed by: INTERNAL MEDICINE

## 2024-02-12 PROCEDURE — G0444 DEPRESSION SCREEN ANNUAL: HCPCS | Performed by: INTERNAL MEDICINE

## 2024-02-12 PROCEDURE — 1170F FXNL STATUS ASSESSED: CPT | Performed by: INTERNAL MEDICINE

## 2024-02-12 PROCEDURE — 3077F SYST BP >= 140 MM HG: CPT | Performed by: INTERNAL MEDICINE

## 2024-02-12 PROCEDURE — 93000 ELECTROCARDIOGRAM COMPLETE: CPT | Performed by: INTERNAL MEDICINE

## 2024-02-12 RX ORDER — LOSARTAN POTASSIUM 50 MG/1
50 TABLET ORAL DAILY
Qty: 30 TABLET | Refills: 11 | Status: SHIPPED | OUTPATIENT
Start: 2024-02-12 | End: 2024-02-12 | Stop reason: SDUPTHER

## 2024-02-12 RX ORDER — LOSARTAN POTASSIUM 50 MG/1
50 TABLET ORAL DAILY
Qty: 30 TABLET | Refills: 11 | Status: SHIPPED | OUTPATIENT
Start: 2024-02-12 | End: 2025-02-11

## 2024-02-12 RX ORDER — HYDROXYZINE HYDROCHLORIDE 25 MG/1
25 TABLET, FILM COATED ORAL DAILY PRN
Qty: 30 TABLET | Refills: 6 | Status: SHIPPED | OUTPATIENT
Start: 2024-02-12 | End: 2025-02-11

## 2024-02-12 ASSESSMENT — PATIENT HEALTH QUESTIONNAIRE - PHQ9
1. LITTLE INTEREST OR PLEASURE IN DOING THINGS: NOT AT ALL
SUM OF ALL RESPONSES TO PHQ9 QUESTIONS 1 AND 2: 0
1. LITTLE INTEREST OR PLEASURE IN DOING THINGS: NOT AT ALL
2. FEELING DOWN, DEPRESSED OR HOPELESS: NOT AT ALL
SUM OF ALL RESPONSES TO PHQ9 QUESTIONS 1 AND 2: 0
2. FEELING DOWN, DEPRESSED OR HOPELESS: NOT AT ALL

## 2024-02-12 ASSESSMENT — ENCOUNTER SYMPTOMS
DEPRESSION: 0
LOSS OF SENSATION IN FEET: 0
OCCASIONAL FEELINGS OF UNSTEADINESS: 0

## 2024-02-12 ASSESSMENT — ACTIVITIES OF DAILY LIVING (ADL)
MANAGING_FINANCES: INDEPENDENT
TOILETING: INDEPENDENT
PATIENT'S MEMORY ADEQUATE TO SAFELY COMPLETE DAILY ACTIVITIES?: YES
DRESSING: INDEPENDENT
JUDGMENT_ADEQUATE_SAFELY_COMPLETE_DAILY_ACTIVITIES: YES
DOING_HOUSEWORK: INDEPENDENT
GROCERY_SHOPPING: INDEPENDENT
FEEDING YOURSELF: INDEPENDENT
ADEQUATE_TO_COMPLETE_ADL: YES
BATHING: INDEPENDENT
TAKING_MEDICATION: INDEPENDENT
GROOMING: INDEPENDENT

## 2024-02-12 NOTE — PROGRESS NOTES
"Subjective   Reason for Visit: Rnady Lewis is an 83 y.o. male here for a Medicare Wellness visit.     Past Medical, Surgical, and Family History reviewed and updated in chart.    Reviewed all medications by prescribing practitioner or clinical pharmacist (such as prescriptions, OTCs, herbal therapies and supplements) and documented in the medical record.    Here for follow-up and wellness visit with his son and wife.  Doing well.  No illnesses or injuries.  He was in to see the eye doctor for a left lower eyelid inflammation.  Improved on doxycycline fish oil and warm compresses.    I wonder about taking a daily aspirin-as it turns out his neurologist question whether he still needs this.  He has been on this since July 2018    Blood pressure has been elevated at times        Patient Care Team:  Miguel A Casanova MD as PCP - General  Miguel A Casanova MD as PCP - [employer]vantage PCP     Review of Systems    Objective   Vitals:  /78 (BP Location: Left arm, Patient Position: Sitting, BP Cuff Size: Adult)   Pulse 55   Temp 36.6 °C (97.9 °F)   Resp 16   Ht 1.74 m (5' 8.5\")   Wt 75.6 kg (166 lb 9.6 oz)   SpO2 97%   BMI 24.96 kg/m²       Physical Exam  Vitals reviewed.   Constitutional:       Appearance: Normal appearance.   HENT:      Head: Normocephalic and atraumatic.   Eyes:      General: No scleral icterus.        Right eye: No discharge.         Left eye: No discharge.      Extraocular Movements: Extraocular movements intact.      Conjunctiva/sclera: Conjunctivae normal.      Pupils: Pupils are equal, round, and reactive to light.   Cardiovascular:      Rate and Rhythm: Normal rate and regular rhythm.      Pulses: Normal pulses.      Heart sounds: Normal heart sounds. No murmur heard.  Pulmonary:      Effort: Pulmonary effort is normal.      Breath sounds: Normal breath sounds. No wheezing or rhonchi.   Musculoskeletal:         General: No deformity or signs of injury. Normal range of motion.      " Cervical back: Normal range of motion and neck supple. No rigidity or tenderness.   Lymphadenopathy:      Cervical: No cervical adenopathy.   Skin:     General: Skin is warm and dry.      Findings: No rash.   Neurological:      General: No focal deficit present.      Mental Status: He is alert and oriented to person, place, and time. Mental status is at baseline.      Cranial Nerves: No cranial nerve deficit.      Sensory: No sensory deficit.      Gait: Gait normal.   Psychiatric:         Mood and Affect: Mood normal.         Behavior: Behavior normal.         Assessment/Plan   Problem List Items Addressed This Visit       Blood pressure elevated without history of HTN    Chronic kidney disease, stage 3a (CMS/HCC)    Moderate Alzheimer's dementia, unspecified timing of dementia onset, unspecified whether behavioral, psychotic, or mood disturbance or anxiety (CMS/HCC)    Dyslipidemia    Relevant Orders    ECG 12 lead (Clinic Performed) (Completed)    Hypothyroidism    Prostate cancer (CMS/HCC)    Memory changes    Relevant Medications    hydrOXYzine HCL (Atarax) 25 mg tablet     Other Visit Diagnoses       Routine general medical examination at health care facility    -  Primary    Relevant Orders    1 Year Follow Up In [facility] - PCP - Wellness Exam    Moderate dementia with anxiety, unspecified dementia type (CMS/HCC)        Essential hypertension with goal blood pressure less than 130/85        Relevant Medications    losartan (Cozaar) 50 mg tablet    Other Relevant Orders    Basic Metabolic Panel               Care planning-his wife Danitza and his son Gene, who lives locally was in attendance    Living situation-he lives with his wife, since February 2023 at the [facility] in [place], and a senior facility with exercise facilities available as well as various activities with others in different groups with activities there.  They live in a one-story condo, with a basement where he has a room  where he enjoys his music.  He no longer drives    Dementia with anxiety and panic attacks-with memory concerns slowly advancing over the last several years.   Neuro psych testing per Dr. Brunson Oct '20 confirmed this. She recommended multiple things, none of which have been done now 5 months out.  At that time, wrote out a list for him to pursue including 1. Neurology consultation 2. Elderly dementia assessment at the [facility]- 3. MRI 4. Occupational driving assessment. After Oct '21 f/u appt w/ Dr. Soto, Aricept started. His wife feels things are a bit better, in part from his concentrating a bit more, and her humor w/ memory lapses. She avoids correcting him in public. He uses [employer] web site and plays his guitar and sings 2 hrs daily.        8/23-memory loss has progressed somewhat, with behavioral symptoms mainly anxiousness and panic attacks with stressful situations.  They visited with Dr. Soto last week-who suggested low-dose Lexapro along with the hydroxyzine.  We spoke at length regarding the as needed hydroxyzine plan, and considering a 12-week trial of the daily Lexapro to help as a maintenance medicine to help with anxiousness.  Up to this point he has been reluctant to consider the low-dose daily Lexapro but states he will try this for 8 to 12 weeks.  He will continue his exercise routine with swimming daily and time with his music in his basement office room, where he enjoys researching and singing music          2/24-he will continue to take his medication and will see his neurologist each October.  He is no longer driving.  Hydroxyzine refilled but fortunately he has not needed this for anxiousness episodes recently          Driving concerns- I encouraged him to exchange his 's license at the [facility] for a state ID card.           2/24-he is no longer driving-he did not pass his driving test.  He has a state ID card now.     Hypertension-. Home blood pressure check has been brought in and  is fairly accurate. They will continue to follow and notify me if over 130.              2/24-blood pressure elevated-he will start low-dose losartan and check a BMP in 2 weeks.  His wife will follow his blood pressure in the afternoon, and after dinner and call if the average is not consistently under 140 systolic      mild chronic kidney disease-stage IIIA- stable on August 2021 labs-we will continue to follow             BMP ordered     Dyslipidemia/elevated 10 year cardiac risk assessment- tolerating additional atorvastatin & baby aspirin               2/24-will discontinue the baby aspirin-his wife states that his neurologist had a concern about this     Exercise routine-he now has a swimming pool both indoor and outdoor at his [facility] and he swims 1/2-hour daily            Encouraged walking and swimming     Abnormal EKG- stress echo unremarkable 8/18. No active cardiac symptoms     Carotid bruit concerns/family history of CVA- mother- carotid duplex unremarkable July '19.     Hypothyroidism - he'll cont thyroid supplement & thyroid labs at least semiannually. TSH elevated 1/22. Ordered 8/22.     Medication concerns-I suggested a pill minder and work to have all of his pills taken by lunchtime.     Prostate cancer 2006- we'll continue annual PSA each summer. He no longer sees Dr. Langston. PSA to be followed annually.    No present urology symptoms. Most recent PSA August 2022 remains undetectable.              Annual PSA ordered     Family history of colon cancer-he will continue a colonoscopy every 3 years    updated per Dr. Martinez April 2019. Last in 4/22. Now on 5 year surveillance - next in 4/27.     Osteopenia- he will continue calcium and vitamin D under the direction of his rheumatologist- Fosamax started Oct '21, after Sept '21 DEXA was a bit worse; Doing well each Saturday morning taking this.    transition to Dr. Barnard in 8/22 to determine if he will require shots or continue with Fosamax.             Last visit Mar '23.  Presently on Prolia-with infusion 6/23, along with blood work.  Anticipate next DEXA in 2024.     Positive MAIK/Raynaud's- His Raynaud's not problematic this winter.  He will continue with rheumatology care     Left shoulder pain - s/p injury w/ overuse fall '18. improved w/ therapy. no longer on meloxicam. Dr. Douglas helped left shoulder w/ yard work.     Mid back pain - onset approximately late 7/22 after lifting a planters' urn. Currently improved. Encouraged heating and stretching for continued relief. Strongly encouraged caution with such risk-taking behavior     Congestion / epistaxis - Flonase helpful. bleeding mainly a winter issue. encouraged nasal saline, and ENT f/u w/ concerns     Chronic edema/spider veins/stasis pigmentation/Right tibial varicose vein-should add noted distal anterior tibial area. compression hose encouraged w/ travel and extended travel. He will continue salt restriction. He may see Dr. Kelley in Derm surgery. RE options. Asymptomatic        Right ankle eczema / Sun exposure history-he will continue sunscreen especially sailing, a and follow-up with derm. ordered; he uses SPF 50 or 100 on the water. Using moisturizer to right ankle following right ankle treatment recently. Skin Care concerns - encouraged skin care, sun screen when outdoors, and follow up w/ dermatology w/ any concerning skin changes. He now sees Dr. Salcedo annually     Skin care - he'll cont. sun screen, and see Dr. Salcedo annually. Will setup appt soon for routine check up.     Tinnitus - improved w/ his music. He wears ear protection     Glasses/family history of cataracts-he will continue annual visits with his eye doctor- Dr. Christiansen annually     Left lower eyelid irritation-he will continue the fish oil following the doxycycline course, and use compresses.  He appears to have a lower mid lid cyst.  No irritation today.  They will [place] back with the eye doctor as needed     Early  cataracts / reading glasses / vision care / retinal freckle - annually exams cont.             They will continue to see Dr. Christiansen each fall     Dental care - semiannually- Dr. Perez - [facility]        Family concerns - daughter in law in [place] had colectomy for colon cancer surgery. in 2020. Doing better           Their son, along w/ his family, visited from [place] earlier this summer.  They hosted the 7 in their new condo.        High dose flu shot encouraged annually after Labor Day- updated fall '23     Shingrix series completed     Covid series completed 3/21. Covid Booster completed. Additional booster shot updated fall '23     Follow-up in 4-5 months, sooner as needed     Charting was completed using voice recognition technology and may include unintended errors.

## 2024-03-15 ENCOUNTER — OFFICE VISIT (OUTPATIENT)
Dept: DERMATOLOGY | Facility: CLINIC | Age: 84
End: 2024-03-15
Payer: MEDICARE

## 2024-03-15 DIAGNOSIS — D22.71 MELANOCYTIC NEVI OF RIGHT LOWER LIMB, INCLUDING HIP: ICD-10-CM

## 2024-03-15 DIAGNOSIS — I87.2 VENOUS STASIS DERMATITIS OF BOTH LOWER EXTREMITIES: ICD-10-CM

## 2024-03-15 DIAGNOSIS — D22.70 MELANOCYTIC NEVUS OF LOWER EXTREMITY INCLUDING HIP, UNSPECIFIED LATERALITY: ICD-10-CM

## 2024-03-15 DIAGNOSIS — L57.8 PHOTOAGING OF SKIN: ICD-10-CM

## 2024-03-15 DIAGNOSIS — L81.4 LENTIGO: ICD-10-CM

## 2024-03-15 DIAGNOSIS — Z12.83 ENCOUNTER FOR SCREENING FOR MALIGNANT NEOPLASM OF SKIN: Primary | ICD-10-CM

## 2024-03-15 DIAGNOSIS — D48.5 NEOPLASM OF UNCERTAIN BEHAVIOR OF SKIN: ICD-10-CM

## 2024-03-15 DIAGNOSIS — D23.9 DERMATOFIBROMA: ICD-10-CM

## 2024-03-15 DIAGNOSIS — D18.01 HEMANGIOMA OF SKIN: ICD-10-CM

## 2024-03-15 DIAGNOSIS — D22.5 MELANOCYTIC NEVI OF TRUNK: ICD-10-CM

## 2024-03-15 DIAGNOSIS — L57.0 ACTINIC KERATOSIS: ICD-10-CM

## 2024-03-15 PROCEDURE — 17003 DESTRUCT PREMALG LES 2-14: CPT | Performed by: INTERNAL MEDICINE

## 2024-03-15 PROCEDURE — 88305 TISSUE EXAM BY PATHOLOGIST: CPT | Performed by: DERMATOLOGY

## 2024-03-15 PROCEDURE — 11102 TANGNTL BX SKIN SINGLE LES: CPT | Performed by: INTERNAL MEDICINE

## 2024-03-15 PROCEDURE — 17000 DESTRUCT PREMALG LESION: CPT | Performed by: INTERNAL MEDICINE

## 2024-03-15 PROCEDURE — 1157F ADVNC CARE PLAN IN RCRD: CPT | Performed by: DERMATOLOGY

## 2024-03-15 PROCEDURE — 1036F TOBACCO NON-USER: CPT | Performed by: DERMATOLOGY

## 2024-03-15 PROCEDURE — 1160F RVW MEDS BY RX/DR IN RCRD: CPT | Performed by: DERMATOLOGY

## 2024-03-15 PROCEDURE — 1123F ACP DISCUSS/DSCN MKR DOCD: CPT | Performed by: DERMATOLOGY

## 2024-03-15 PROCEDURE — 99203 OFFICE O/P NEW LOW 30 MIN: CPT | Performed by: DERMATOLOGY

## 2024-03-15 PROCEDURE — 1159F MED LIST DOCD IN RCRD: CPT | Performed by: DERMATOLOGY

## 2024-03-15 ASSESSMENT — DERMATOLOGY PATIENT ASSESSMENT
DO YOU USE SUNSCREEN: OCCASIONALLY
HAVE YOU HAD OR DO YOU HAVE A STAPH INFECTION: NO
ARE YOU AN ORGAN TRANSPLANT RECIPIENT: NO
DO YOU HAVE ANY NEW OR CHANGING LESIONS: YES
DO YOU USE A TANNING BED: NO
HAVE YOU HAD OR DO YOU HAVE VASCULAR DISEASE: NO
WHERE ARE THESE NEW OR CHANGING LESIONS LOCATED: BACK AND HANDS

## 2024-03-15 ASSESSMENT — DERMATOLOGY QUALITY OF LIFE (QOL) ASSESSMENT
WHAT SINGLE SKIN CONDITION LISTED BELOW IS THE PATIENT ANSWERING THE QUALITY-OF-LIFE ASSESSMENT QUESTIONS ABOUT: NONE OF THE ABOVE
DATE THE QUALITY-OF-LIFE ASSESSMENT WAS COMPLETED: 66914
RATE HOW EMOTIONALLY BOTHERED YOU ARE BY YOUR SKIN PROBLEM (FOR EXAMPLE, WORRY, EMBARRASSMENT, FRUSTRATION): 0 - NEVER BOTHERED
RATE HOW BOTHERED YOU ARE BY EFFECTS OF YOUR SKIN PROBLEMS ON YOUR ACTIVITIES (EG, GOING OUT, ACCOMPLISHING WHAT YOU WANT, WORK ACTIVITIES OR YOUR RELATIONSHIPS WITH OTHERS): 0 - NEVER BOTHERED
ARE THERE EXCLUSIONS OR EXCEPTIONS FOR THE QUALITY OF LIFE ASSESSMENT: NO
RATE HOW BOTHERED YOU ARE BY SYMPTOMS OF YOUR SKIN PROBLEM (EG, ITCHING, STINGING BURNING, HURTING OR SKIN IRRITATION): 0 - NEVER BOTHERED

## 2024-03-15 ASSESSMENT — PATIENT GLOBAL ASSESSMENT (PGA): PATIENT GLOBAL ASSESSMENT: PATIENT GLOBAL ASSESSMENT:  2 - MILD

## 2024-03-15 ASSESSMENT — ITCH NUMERIC RATING SCALE: HOW SEVERE IS YOUR ITCHING?: 0

## 2024-03-15 NOTE — PROGRESS NOTES
Subjective     Randy Lewis is a 83 y.o. male who presents for the following: Skin Check (Pt here for FBSE with no reported hx. Pt reports area of concern on back and hands. ).     Spots on his back are itchy. No lesions are bleeding or changing in color, size, shape.    Review of Systems:  No other skin or systemic complaints other than what is documented elsewhere in the note.    The following portions of the chart were reviewed this encounter and updated as appropriate:         Skin Cancer History    No skin cancer on file.  Hx of AKs     Lesion 1: Moderate Dysplastic Junctional Nevus. Year Diagnosed: 2019. August. Location: Left Upper Back. Treatment(s): Wide Local Excision. Excision margin: 0.3 cm Treated by: Mayuri Salcedo MD.    Specialty Problems          Dermatology Problems    Stasis pigmentation        Objective   Well appearing patient in no apparent distress; mood and affect are within normal limits.    A full examination was performed including scalp, head, eyes, ears, nose, lips, neck, chest, axillae, abdomen, back, buttocks, bilateral upper extremities, bilateral lower extremities, hands, feet, fingers, toes, fingernails, and toenails. All findings within normal limits unless otherwise noted below.    Assessment/Plan   1. Encounter for screening for malignant neoplasm of skin  No suspicious lesions noted on examination today    The risk of chronic, cumulative sun damage and risk of development of skin cancer was reviewed today.   The importance of sun protection was reviewed: including the use of a broad spectrum sunscreen that protects against both UVA/UVB rays, with ingredients such as Zinc oxide or titanium dioxide, wearing sun protective clothing and sun avoidance. We reviewed the warning signs of non-melanoma skin cancer and ABCDEs of melanoma  Please follow up should you notice any new or changing pre-existing skin lesion.    Related Procedures  Follow Up In Dermatology - Established  Patient    2. Neoplasm of uncertain behavior of skin  Right Upper Back  Pink 5x 4mm scaly papule with vascular structures              Lesion biopsy  Type of biopsy: tangential    Informed consent: discussed and consent obtained    Timeout: patient name, date of birth, surgical site, and procedure verified    Procedure prep:  Patient was prepped and draped  Anesthesia: the lesion was anesthetized in a standard fashion    Anesthetic:  1% lidocaine w/ epinephrine 1-100,000 local infiltration  Instrument used: DermaBlade    Hemostasis achieved with: aluminum chloride    Outcome: patient tolerated procedure well    Post-procedure details: sterile dressing applied and wound care instructions given    Dressing type: petrolatum and bandage      Staff Communication: Dermatology Local Anesthesia: 1 % Lidocaine / Epinephrine - Amount:    Specimen 1 - Dermatopathology- DERM LAB  Differential Diagnosis: basal cell carcinoma vs irritated seborrheic keratosis vs benign lichenoid keratosis  Check Margins Yes/No?:    Comments:    Dermpath Lab: Routine Histopathology (formalin-fixed tissue)    Lesion concerning for basal cell carcinoma vs irritated seborrheic keratosis vs benign lichenoid keratosis . The need for biopsy for definitive diagnosis recommended. Risks and benefits reviewed, see procedure note.    3. Actinic keratosis (11)  Left Parotid Area, Left Preauricular Area, Left Zygomatic Area, Mid Frontal Scalp (2), Right Malar Cheek, Right Parotid Area (3), Right Temple (2)  Erythematous macules with gritty scale.    Lesions are due to cumulative sun damage over time and are pre-cancerous. They have a risk (although small in majority of patients) of developing into squamous cell carcinoma and therefore treatment recommendations were offered and discussed.   Treatments Discussed include LN2, photodynamic (blue light) therapy & topical chemotherapy creams, risks and benefits of each.     The risks and benefits of LN2 were  reviewed including incomplete removal, crusting, blister hypo and/or hyperpigmentation, scarring and recurrence. Pt elected for LN2 today    Destr of lesion - Left Parotid Area, Left Preauricular Area, Left Zygomatic Area, Mid Frontal Scalp (2), Right Malar Cheek, Right Parotid Area (3), Right [place] (2)  Complexity: simple    Destruction method: cryotherapy    Informed consent: discussed and consent obtained    Lesion destroyed using liquid nitrogen: Yes    Cryotherapy cycles:  1  Outcome: patient tolerated procedure well with no complications    Post-procedure details: wound care instructions given      4. Photoaging of skin  Mottled pigmentation with telangiectasias and brown reticular macules in sun exposed areas of the body.    The risk of chronic, cumulative sun damage and risk of development of skin cancer was reviewed today.   The importance of sun protection was reviewed: including the use of a broad spectrum sunscreen that protects against both UVA/UVB rays, with ingredients such as Zinc oxide or titanium dioxide, wearing sun protective clothing and sun avoidance. We reviewed the warning signs of non-melanoma skin cancer and ABCDEs of melanoma  Please follow up should you notice any new or changing pre-existing skin lesion.    5. Dermatofibroma  Right Foot - Anterior  Firm pink papule with hyperpigmented halo, +dimple sign    Benign, scar tissue like growth that most frequently is due to bug bite or ingrown hair. No treatment is necessary.    6. Venous stasis dermatitis of both lower extremities  Bilateral legs with erythematous scaly thin plaques with a cracked appearance and background brown orange macules and patches     With background varicose veins- No associated pruritus  Dry skin is common, often worse during the dry months of the year and may also worsen as we age.  A gentle skin care regimen includes:  -washing with a mild soap without fragrance such as Dove for sensitive skin, Cetaphil or  Cerave.  -pat dry after washing and then apply a thick moisturizer such as Cerave cream or Cetaphil cream. Creams are thicker than lotions and often do a better job of restoring the skin barrier.  - Not itchy so we will not start topical steroids. May reconsider in the future if symptomatic      7. Lentigo  Scattered tan macules in sun-exposed areas.    These are benign skin lesions due to sun exposure. They will darken in response to sun exposure. They should be monitored for change in size, shape or color.  These lesions can be treated cosmetically with topical creams, liquid nitrogen and a variety of lasers.    8. Hemangioma of skin  Cherry red papules    The benign nature of these skin lesions were reviewed, no treatment is necessary.   Please follow up for any new or pre-existing lesion that is changing in size, shape, color, becomes painful, tender, itches or bleed.    9. Melanocytic nevi of trunk  Tan-brown symmetric macules and papules    Clinically benign appearing nevi, no treatment is necessary.  The importance of sun protection was reviewed: including the use of a broad spectrum sunscreen that protects against both UVA/UVB rays, with ingredients such as Zinc oxide or titanium dioxide, wearing sun protective clothing and sun avoidance.   ABCDEs of melanoma reviewed.  Please follow up should you notice any new or changing pre-existing skin lesion.    10. Melanocytic nevi of right lower limb, including hip  Scattered, uniform and benign-appearing, regular brown melanocytic papules and macules.    Clinically benign appearing nevi, no treatment is necessary.  The importance of sun protection was reviewed: including the use of a broad spectrum sunscreen that protects against both UVA/UVB rays, with ingredients such as Zinc oxide or titanium dioxide, wearing sun protective clothing and sun avoidance.   ABCDEs of melanoma reviewed.  Please follow up should you notice any new or changing pre-existing skin  lesion.    11. Melanocytic nevus of lower extremity including hip, unspecified laterality  Scattered, uniform and benign-appearing, regular brown melanocytic papules and macules.    Clinically benign appearing nevi, no treatment is necessary.  The importance of sun protection was reviewed: including the use of a broad spectrum sunscreen that protects against both UVA/UVB rays, with ingredients such as Zinc oxide or titanium dioxide, wearing sun protective clothing and sun avoidance.   ABCDEs of melanoma reviewed.  Please follow up should you notice any new or changing pre-existing skin lesion.        Follow up in 1 year for skin exam or sooner pending biopsy results or if new or concerning lesions.    Iliana Castillo DO - PGY-4     I saw and evaluated the patient. I personally obtained the key and critical portions of the history and physical exam or was physically present for key and critical portions performed by the resident/fellow. I reviewed the resident/fellow's documentation and discussed the patient with the resident/fellow. I agree with the resident/fellow's medical decision making as documented in the note.    Katie Barillas DO

## 2024-03-20 ENCOUNTER — OFFICE VISIT (OUTPATIENT)
Dept: RHEUMATOLOGY | Facility: CLINIC | Age: 84
End: 2024-03-20
Payer: MEDICARE

## 2024-03-20 VITALS
WEIGHT: 165 LBS | HEART RATE: 57 BPM | DIASTOLIC BLOOD PRESSURE: 68 MMHG | BODY MASS INDEX: 24.72 KG/M2 | SYSTOLIC BLOOD PRESSURE: 129 MMHG

## 2024-03-20 DIAGNOSIS — M81.8 AGE-RELATED OSTEOPOROSIS WITHOUT FRACTURE: Primary | ICD-10-CM

## 2024-03-20 LAB
LABORATORY COMMENT REPORT: NORMAL
PATH REPORT.FINAL DX SPEC: NORMAL
PATH REPORT.GROSS SPEC: NORMAL
PATH REPORT.MICROSCOPIC SPEC OTHER STN: NORMAL
PATH REPORT.RELEVANT HX SPEC: NORMAL
PATH REPORT.TOTAL CANCER: NORMAL

## 2024-03-20 PROCEDURE — 1036F TOBACCO NON-USER: CPT | Performed by: INTERNAL MEDICINE

## 2024-03-20 PROCEDURE — 99213 OFFICE O/P EST LOW 20 MIN: CPT | Performed by: INTERNAL MEDICINE

## 2024-03-20 PROCEDURE — 1160F RVW MEDS BY RX/DR IN RCRD: CPT | Performed by: INTERNAL MEDICINE

## 2024-03-20 PROCEDURE — 1123F ACP DISCUSS/DSCN MKR DOCD: CPT | Performed by: INTERNAL MEDICINE

## 2024-03-20 PROCEDURE — 1157F ADVNC CARE PLAN IN RCRD: CPT | Performed by: INTERNAL MEDICINE

## 2024-03-20 PROCEDURE — 1159F MED LIST DOCD IN RCRD: CPT | Performed by: INTERNAL MEDICINE

## 2024-03-20 NOTE — PROGRESS NOTES
He presents for follow-up evaluation without musculoskeletal joint symptoms.  He has not noted any muscle cramping or joint swelling.  He has not had any bone fractures.    He has a thin body habitus.  There is no tenderness to palpation over the upper or lower extremities.  There are no joint effusions.  He has mild bilateral hamstring tightness.  There is no peripheral edema.    DEXA bone density (9/10/2021) left femoral neck T-score -2.6, left total femur T-score -1.9, L1-L4 T-score -1.2.  DEXA bone density (3/6/2018) left femoral neck T-score -1.9, left total femur T-score -1.8, L1-L4 T-score 0.2.    Laboratory (11/22/2023) WBC 7.2, hemoglobin 16.4, hematocrit 50.7, MCV 89, MCHC 32.3, platelets 176, PSA <0.01, free T40.94, TSH 4.64.    He has hypothyroidism, Raynaud's phenomena, renal insufficiency, hypertension, status post treatment for prostate cancer, and osteoporosis.  He has had 3 doses of Prolia with the most recent dose being 12/2023.    He is to continue with Prolia injections for treatment for osteoporosis.  He is to return for follow-up evaluation in 6 months.  He is having repeat DEXA bone density after the fourth Prolia injection.

## 2024-04-29 DIAGNOSIS — E03.9 ACQUIRED HYPOTHYROIDISM: ICD-10-CM

## 2024-04-29 RX ORDER — LEVOTHYROXINE SODIUM 50 UG/1
50 TABLET ORAL
Qty: 90 TABLET | Refills: 1 | Status: SHIPPED | OUTPATIENT
Start: 2024-04-29

## 2024-05-06 ENCOUNTER — LAB (OUTPATIENT)
Dept: LAB | Facility: LAB | Age: 84
End: 2024-05-06
Payer: MEDICARE

## 2024-05-08 ENCOUNTER — LAB (OUTPATIENT)
Dept: LAB | Facility: LAB | Age: 84
End: 2024-05-08
Payer: MEDICARE

## 2024-05-08 DIAGNOSIS — M85.80 OSTEOPENIA, UNSPECIFIED LOCATION: ICD-10-CM

## 2024-05-08 DIAGNOSIS — I10 ESSENTIAL HYPERTENSION WITH GOAL BLOOD PRESSURE LESS THAN 130/85: ICD-10-CM

## 2024-05-08 DIAGNOSIS — M81.8 AGE-RELATED OSTEOPOROSIS WITHOUT FRACTURE: ICD-10-CM

## 2024-05-08 LAB
ALBUMIN SERPL BCP-MCNC: 4.1 G/DL (ref 3.4–5)
ALP SERPL-CCNC: 74 U/L (ref 33–136)
ALT SERPL W P-5'-P-CCNC: 24 U/L (ref 10–52)
ANION GAP SERPL CALC-SCNC: 9 MMOL/L (ref 10–20)
AST SERPL W P-5'-P-CCNC: 28 U/L (ref 9–39)
BILIRUB SERPL-MCNC: 0.8 MG/DL (ref 0–1.2)
BUN SERPL-MCNC: 23 MG/DL (ref 6–23)
CALCIUM SERPL-MCNC: 9.3 MG/DL (ref 8.6–10.3)
CHLORIDE SERPL-SCNC: 107 MMOL/L (ref 98–107)
CO2 SERPL-SCNC: 31 MMOL/L (ref 21–32)
CREAT SERPL-MCNC: 1.43 MG/DL (ref 0.5–1.3)
EGFRCR SERPLBLD CKD-EPI 2021: 49 ML/MIN/1.73M*2
GLUCOSE SERPL-MCNC: 104 MG/DL (ref 74–99)
MAGNESIUM SERPL-MCNC: 1.95 MG/DL (ref 1.6–2.4)
POTASSIUM SERPL-SCNC: 4.3 MMOL/L (ref 3.5–5.3)
PROT SERPL-MCNC: 6.8 G/DL (ref 6.4–8.2)
SODIUM SERPL-SCNC: 143 MMOL/L (ref 136–145)

## 2024-05-08 PROCEDURE — 83735 ASSAY OF MAGNESIUM: CPT

## 2024-05-08 PROCEDURE — 80053 COMPREHEN METABOLIC PANEL: CPT

## 2024-05-08 PROCEDURE — 36415 COLL VENOUS BLD VENIPUNCTURE: CPT

## 2024-05-09 ENCOUNTER — OFFICE VISIT (OUTPATIENT)
Dept: NEUROLOGY | Facility: CLINIC | Age: 84
End: 2024-05-09
Payer: MEDICARE

## 2024-05-09 VITALS
WEIGHT: 167.4 LBS | HEIGHT: 68 IN | DIASTOLIC BLOOD PRESSURE: 72 MMHG | HEART RATE: 50 BPM | SYSTOLIC BLOOD PRESSURE: 152 MMHG | BODY MASS INDEX: 25.37 KG/M2 | TEMPERATURE: 96.6 F

## 2024-05-09 DIAGNOSIS — F02.B0 MODERATE ALZHEIMER'S DEMENTIA, UNSPECIFIED TIMING OF DEMENTIA ONSET, UNSPECIFIED WHETHER BEHAVIORAL, PSYCHOTIC, OR MOOD DISTURBANCE OR ANXIETY (MULTI): Primary | ICD-10-CM

## 2024-05-09 DIAGNOSIS — G30.9 MODERATE ALZHEIMER'S DEMENTIA, UNSPECIFIED TIMING OF DEMENTIA ONSET, UNSPECIFIED WHETHER BEHAVIORAL, PSYCHOTIC, OR MOOD DISTURBANCE OR ANXIETY (MULTI): Primary | ICD-10-CM

## 2024-05-09 PROCEDURE — 1123F ACP DISCUSS/DSCN MKR DOCD: CPT | Performed by: STUDENT IN AN ORGANIZED HEALTH CARE EDUCATION/TRAINING PROGRAM

## 2024-05-09 PROCEDURE — 1160F RVW MEDS BY RX/DR IN RCRD: CPT | Performed by: STUDENT IN AN ORGANIZED HEALTH CARE EDUCATION/TRAINING PROGRAM

## 2024-05-09 PROCEDURE — 99214 OFFICE O/P EST MOD 30 MIN: CPT | Performed by: STUDENT IN AN ORGANIZED HEALTH CARE EDUCATION/TRAINING PROGRAM

## 2024-05-09 PROCEDURE — 1036F TOBACCO NON-USER: CPT | Performed by: STUDENT IN AN ORGANIZED HEALTH CARE EDUCATION/TRAINING PROGRAM

## 2024-05-09 PROCEDURE — 1157F ADVNC CARE PLAN IN RCRD: CPT | Performed by: STUDENT IN AN ORGANIZED HEALTH CARE EDUCATION/TRAINING PROGRAM

## 2024-05-09 PROCEDURE — 1159F MED LIST DOCD IN RCRD: CPT | Performed by: STUDENT IN AN ORGANIZED HEALTH CARE EDUCATION/TRAINING PROGRAM

## 2024-05-09 ASSESSMENT — PATIENT HEALTH QUESTIONNAIRE - PHQ9
SUM OF ALL RESPONSES TO PHQ9 QUESTIONS 1 & 2: 0
2. FEELING DOWN, DEPRESSED OR HOPELESS: NOT AT ALL
1. LITTLE INTEREST OR PLEASURE IN DOING THINGS: NOT AT ALL

## 2024-05-09 ASSESSMENT — LIFESTYLE VARIABLES
HOW OFTEN DO YOU HAVE A DRINK CONTAINING ALCOHOL: NEVER
HOW OFTEN DO YOU HAVE SIX OR MORE DRINKS ON ONE OCCASION: NEVER
HOW MANY STANDARD DRINKS CONTAINING ALCOHOL DO YOU HAVE ON A TYPICAL DAY: PATIENT DOES NOT DRINK
SKIP TO QUESTIONS 9-10: 1
AUDIT-C TOTAL SCORE: 0

## 2024-05-09 NOTE — PROGRESS NOTES
Subjective     Randy Lewis is a 83 y.o. year old male who presents for follow-up of dementia.     He was last seen 8/2/2023. At the time, he was having increasing anxiety and was started on escitalopram. He was also on donepezil 10mg daily. He was referred for an OT driving evaluation, he was recommended to CEASE driving. He is no longer driving as he did not pass the BMV exam either. He has a state ID now.     His wife took him golfing on Monday and she drove the cart for him. She helps keeping score for him. He is largely stable. He is on donepezil and tolerating it well.     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     Patient Active Problem List   Diagnosis    Acute upper respiratory infection    Adenomatous colon polyp    Anemia    Anxiousness    Blood pressure elevated without history of HTN    Chronic kidney disease, stage 3a (Multi)    Moderate Alzheimer's dementia, unspecified timing of dementia onset, unspecified whether behavioral, psychotic, or mood disturbance or anxiety (Multi)    Dyslipidemia    Edema    Hypothyroidism    Osteopenia    Osteoporosis without current pathological fracture    Pain in joint of left shoulder    Prostate cancer (Multi)    Raynauds phenomenon    Stasis pigmentation    Varicose veins of leg with swelling    Weakness of shoulder    Wears glasses    Memory changes    Driving safety issue    Age-related nuclear cataract of both eyes    Asteroid hyalosis of right eye    Choroidal nevus of left eye    Dizziness and giddiness    Generalized hyperhidrosis     Objective   Neurological Exam  Mental Status  Awake, alert and oriented to person, place and time. Speech is normal.  MMSE - 20/30, 1/5 for date, 4/5 orientation, 3/3 immediate recall 0/3 delayed recall but 1/3 with category clues, 1/2 naming objects, spelled world backwards, 0/1 for  copying drawing.    Cranial Nerves  CN III, IV, VI: Extraocular movements intact bilaterally.  CN VII: Full and symmetric facial movement.  CN VIII: Hearing is normal.    Motor   Strength is 5/5 throughout all four extremities.    Gait  Casual gait is normal including stance, stride, and arm swing.    Physical Exam  Eyes:      Extraocular Movements: Extraocular movements intact.   Neurological:      Motor: Motor strength is normal.  Psychiatric:         Speech: Speech normal.       Assessment/Plan   Diagnoses and all orders for this visit:  Moderate Alzheimer's dementia, unspecified timing of dementia onset, unspecified whether behavioral, psychotic, or mood disturbance or anxiety (Multi)    1. Dementia without behavioral disturbances: MMSE 20/30. Some decline with an increase in anxiety. Continue escitalopram 5mg daily. Continue donepezil 10mg daily. No longer driving. Would not be a candidate for Leqembi based on current MMSE score. Ok to resume low dose aspirin 81mg daily for primary prevention. There is a risk of bleeding with SSRIs, family aware, has not had bleeding side effects up until this point.      Follow-up in 6 months

## 2024-05-31 DIAGNOSIS — M85.80 OSTEOPENIA, UNSPECIFIED LOCATION: Primary | ICD-10-CM

## 2024-05-31 NOTE — PROGRESS NOTES
Received message from [facility]-  We have Randy Lewis scheduled on 06.06.24 to come in for Denosumab but do not have a current prescription.  We are in need of a smart set sent to home infusion pharmacy to accommodate his appointment.     Per Dr. Barnard 3/20/24 FUV-  He is to continue with Prolia injections for treatment for osteoporosis. He is to return for follow-up evaluation in 6 months. He is having repeat DEXA bone density after the fourth Prolia injection.

## 2024-06-04 ENCOUNTER — APPOINTMENT (OUTPATIENT)
Dept: INFUSION THERAPY | Facility: CLINIC | Age: 84
End: 2024-06-04
Payer: MEDICARE

## 2024-06-06 ENCOUNTER — INFUSION (OUTPATIENT)
Dept: INFUSION THERAPY | Facility: CLINIC | Age: 84
End: 2024-06-06
Payer: MEDICARE

## 2024-06-06 VITALS
SYSTOLIC BLOOD PRESSURE: 145 MMHG | OXYGEN SATURATION: 97 % | RESPIRATION RATE: 16 BRPM | HEART RATE: 66 BPM | TEMPERATURE: 97 F | DIASTOLIC BLOOD PRESSURE: 81 MMHG

## 2024-06-06 DIAGNOSIS — M85.80 OSTEOPENIA, UNSPECIFIED LOCATION: ICD-10-CM

## 2024-06-06 PROCEDURE — 96372 THER/PROPH/DIAG INJ SC/IM: CPT | Performed by: REGISTERED NURSE

## 2024-06-06 RX ORDER — FAMOTIDINE 10 MG/ML
20 INJECTION INTRAVENOUS ONCE AS NEEDED
OUTPATIENT
Start: 2024-11-16

## 2024-06-06 RX ORDER — EPINEPHRINE 0.3 MG/.3ML
0.3 INJECTION SUBCUTANEOUS EVERY 5 MIN PRN
OUTPATIENT
Start: 2024-11-16

## 2024-06-06 RX ORDER — ALBUTEROL SULFATE 0.83 MG/ML
3 SOLUTION RESPIRATORY (INHALATION) AS NEEDED
OUTPATIENT
Start: 2024-11-16

## 2024-06-06 RX ORDER — DIPHENHYDRAMINE HYDROCHLORIDE 50 MG/ML
50 INJECTION INTRAMUSCULAR; INTRAVENOUS AS NEEDED
OUTPATIENT
Start: 2024-11-16

## 2024-06-06 NOTE — PROGRESS NOTES
[facility]   Infusion Clinic Note   Date: 2024   Name: Randy Lewis  : 1940   MRN: [id number]         Reason for Visit: OP Infusion (Pt here for q 6 month Prolia injection)      Accompanied by:Relative   Visit Type:: injection   Diagnosis: Osteopenia, unspecified location    Allergies:   Allergies as of 2024    (No Known Allergies)      Current Meds has a current medication list which includes the following prescription(s): atorvastatin, calcium carbonate, cholecalciferol, denosumab, donepezil, escitalopram, hydroxyzine hcl, levothyroxine, losartan, and multivitamin.        Vitals:  Vitals:    24 1020   BP: 145/81   Pulse: 66   Resp: 16   Temp: 36.1 °C (97 °F)   SpO2: 97%      Infusion Pre-procedure Checklist   Allergies reviewed: yes   Medications reviewed: yes   Contraindications to treatment:No   Previous reaction to current treatment:No   Current Health Issues: None   Pain: '    Is the pain different from normal: No   Is the pain tolerable: n/a   Is your Doctor aware: n/a   Contraindications based on patient history: No   Provider notified: Not applicable   Labs: Labs reviewed   Fall Risk Screening:      Review of Systems - Oncology   Negative for complaint: [] all other systems have been reviewed and are negative for complaint   Infusion Readiness:   Assessment Concerns Related to Infusion: No  Provider notified: n/a  Assess patient for the concerns below. Document provider notification as appropriate:  - Does not meet criteria to treat N/A  - Has an active or recent infection with/without current antibiotic use No  - Has recent/planned dental work No  - Has recent/planned surgeries No  - Has recently received or plans to receive vaccinations No  - Has treatment related toxicities No  - Is pregnant (unless noted otherwise) N/A      (Unless otherwise specified on patient specific therapy plan):     TREATMENT CONDITIONS:  Unless otherwise specified on  patient specific therapy plan HOLD and notify provider prior to proceeding with today's injection if patients:  o Calcium is LESS THAN 8.6 mg/dL OR  Ionized Calcium LESS THAN 1.1 mmol/L  o Recent or planned invasive dental procedure (within 4 weeks)    Lab Results   Component Value Date    CALCIUM 9.3 05/08/2024      Lab Results   Component Value Date    CAION 1.26 12/05/2022       Labs reviewed and patient meets treatment conditions? Yes    DRUG SPECIFIC QUESTIONS:  Is the patient taking calcium and vitamin D? Yes  (Recommended)    Pt Instructed on following risks: (1) hypocalcemia, (2) osteonecrosis of the jaw, (3) atypical femoral fractures, (4) serious infections, and (5) dermatologic reactions?  No      REMINDER:  PREGNANCY CATEGORY X DRUG. OBTAIN NEGTATIVE PREGNANCY TEST PRIOR TO FIRST INFUSION FOR WOMEN OF CHILDBEARING ABILITY   REMS DRUG    Recommended Vitals/Observation:  Vitals: Obtain vitals prior to injection.  Observation: Patient may leave immediately following injection.     Initiated By: Erendira Hinson RN   Time: 10:46 AM     We administered denosumab.        All questions and concerns were addressed at time of visit. Patient was not independently evaluated by the Nurse Practitioner on site at [facility].

## 2024-06-06 NOTE — PATIENT INSTRUCTIONS
Today :We administered denosumab.     For:   1. Osteopenia, unspecified location         Your next appointment is due in:  6 months        Please read the  Medication Guide that was given to you and reviewed during todays visit.     (Tell all doctors including dentists that you are taking this medication)     Go to the emergency room or call 911 if:  -You have signs of allergic reaction:   -Rash, hives, itching.   -Swollen, blistered, peeling skin.   -Swelling of face, lips, mouth, tongue or throat.   -Tightness of chest, trouble breathing, swallowing or talking     Call your doctor:  - If IV / injection site gets red, warm, swollen, itchy or leaks fluid or pus.     (Leave dressing on your IV site for at least 2 hours and keep area clean and dry  - If you get sick or have symptoms of infection or are not feeling well for any reason.    (Wash your hands often, stay away from people who are sick)  - If you have side effects from your medication that do not go away or are bothersome.     (Refer to the teaching your nurse gave you for side effects to call your doctor about)    - Common side effects may include:  stuffy nose, headache, feeling tired, muscle aches, upset stomach  - Before receiving any vaccines     - Call the [facility] at [phone]  If:  - You get sick, are on antibiotics, have had a recent vaccine, have surgery or dental work and your doctor wants your visit rescheduled.  - You need to cancel and reschedule your visit for any reason. Call at least 2 days before your visit if you need to cancel.   - Your insurance changes before your next visit.    (We will need to get approval from your new insurance. This can take up to two weeks.)     The [facility] is opened Monday thru Friday, Saturdays. We are closed on Sundays and holidays.   Voice mail will take your call if the center is closed. If you leave a message please allow 24 hours for a call back during weekdays. If  you leave a message on a weekend/holiday, we will call you back the next business day.

## 2024-07-15 ENCOUNTER — APPOINTMENT (OUTPATIENT)
Dept: PRIMARY CARE | Facility: CLINIC | Age: 84
End: 2024-07-15
Payer: MEDICARE

## 2024-07-15 ENCOUNTER — LAB (OUTPATIENT)
Dept: LAB | Facility: LAB | Age: 84
End: 2024-07-15
Payer: MEDICARE

## 2024-07-15 VITALS
OXYGEN SATURATION: 97 % | HEIGHT: 68 IN | SYSTOLIC BLOOD PRESSURE: 122 MMHG | BODY MASS INDEX: 24.55 KG/M2 | DIASTOLIC BLOOD PRESSURE: 82 MMHG | HEART RATE: 55 BPM | WEIGHT: 162 LBS | TEMPERATURE: 97.4 F

## 2024-07-15 DIAGNOSIS — M81.0 OSTEOPOROSIS WITHOUT CURRENT PATHOLOGICAL FRACTURE, UNSPECIFIED OSTEOPOROSIS TYPE: ICD-10-CM

## 2024-07-15 DIAGNOSIS — Z23 IMMUNIZATION DUE: ICD-10-CM

## 2024-07-15 DIAGNOSIS — E03.9 ACQUIRED HYPOTHYROIDISM: ICD-10-CM

## 2024-07-15 DIAGNOSIS — N18.31 CHRONIC KIDNEY DISEASE, STAGE 3A (MULTI): ICD-10-CM

## 2024-07-15 DIAGNOSIS — R03.0 BLOOD PRESSURE ELEVATED WITHOUT HISTORY OF HTN: Primary | ICD-10-CM

## 2024-07-15 DIAGNOSIS — Z00.00 ROUTINE GENERAL MEDICAL EXAMINATION AT HEALTH CARE FACILITY: ICD-10-CM

## 2024-07-15 DIAGNOSIS — C61 PROSTATE CANCER (MULTI): ICD-10-CM

## 2024-07-15 DIAGNOSIS — Z71.85 IMMUNIZATION COUNSELING: ICD-10-CM

## 2024-07-15 DIAGNOSIS — E78.5 DYSLIPIDEMIA: ICD-10-CM

## 2024-07-15 DIAGNOSIS — I10 ESSENTIAL HYPERTENSION WITH GOAL BLOOD PRESSURE LESS THAN 130/85: ICD-10-CM

## 2024-07-15 PROBLEM — J06.9 ACUTE UPPER RESPIRATORY INFECTION: Status: RESOLVED | Noted: 2023-07-19 | Resolved: 2024-07-15

## 2024-07-15 LAB
ANION GAP SERPL CALC-SCNC: 8 MMOL/L (ref 10–20)
BUN SERPL-MCNC: 20 MG/DL (ref 6–23)
CALCIUM SERPL-MCNC: 9.4 MG/DL (ref 8.6–10.6)
CHLORIDE SERPL-SCNC: 106 MMOL/L (ref 98–107)
CO2 SERPL-SCNC: 30 MMOL/L (ref 21–32)
CREAT SERPL-MCNC: 1.32 MG/DL (ref 0.5–1.3)
EGFRCR SERPLBLD CKD-EPI 2021: 54 ML/MIN/1.73M*2
GLUCOSE SERPL-MCNC: 84 MG/DL (ref 74–99)
POTASSIUM SERPL-SCNC: 4.3 MMOL/L (ref 3.5–5.3)
SODIUM SERPL-SCNC: 140 MMOL/L (ref 136–145)
TSH SERPL-ACNC: 2.21 MIU/L (ref 0.44–3.98)

## 2024-07-15 PROCEDURE — 1160F RVW MEDS BY RX/DR IN RCRD: CPT | Performed by: INTERNAL MEDICINE

## 2024-07-15 PROCEDURE — 84443 ASSAY THYROID STIM HORMONE: CPT

## 2024-07-15 PROCEDURE — 1157F ADVNC CARE PLAN IN RCRD: CPT | Performed by: INTERNAL MEDICINE

## 2024-07-15 PROCEDURE — 99214 OFFICE O/P EST MOD 30 MIN: CPT | Performed by: INTERNAL MEDICINE

## 2024-07-15 PROCEDURE — 3074F SYST BP LT 130 MM HG: CPT | Performed by: INTERNAL MEDICINE

## 2024-07-15 PROCEDURE — 1123F ACP DISCUSS/DSCN MKR DOCD: CPT | Performed by: INTERNAL MEDICINE

## 2024-07-15 PROCEDURE — 36415 COLL VENOUS BLD VENIPUNCTURE: CPT

## 2024-07-15 PROCEDURE — G2211 COMPLEX E/M VISIT ADD ON: HCPCS | Performed by: INTERNAL MEDICINE

## 2024-07-15 PROCEDURE — 1159F MED LIST DOCD IN RCRD: CPT | Performed by: INTERNAL MEDICINE

## 2024-07-15 PROCEDURE — 80048 BASIC METABOLIC PNL TOTAL CA: CPT

## 2024-07-15 PROCEDURE — 3079F DIAST BP 80-89 MM HG: CPT | Performed by: INTERNAL MEDICINE

## 2024-07-15 RX ORDER — LOSARTAN POTASSIUM 50 MG/1
50 TABLET ORAL DAILY
Qty: 90 TABLET | Refills: 3 | Status: SHIPPED | OUTPATIENT
Start: 2024-07-15 | End: 2025-07-15

## 2024-07-15 ASSESSMENT — PATIENT HEALTH QUESTIONNAIRE - PHQ9
2. FEELING DOWN, DEPRESSED OR HOPELESS: NOT AT ALL
1. LITTLE INTEREST OR PLEASURE IN DOING THINGS: NOT AT ALL
SUM OF ALL RESPONSES TO PHQ9 QUESTIONS 1 AND 2: 0

## 2024-07-15 ASSESSMENT — ENCOUNTER SYMPTOMS
LOSS OF SENSATION IN FEET: 0
OCCASIONAL FEELINGS OF UNSTEADINESS: 0
DEPRESSION: 0

## 2024-07-15 NOTE — PROGRESS NOTES
"Subjective   Patient ID: Randy Lewis is a 83 y.o. male who presents for Follow-up.    Here for 5-month follow-up with his wife  Overall doing well.  He he swims about an hour a day at their pool  He is also golfing once a week.  No recent illnesses or injuries.  He does note some dry skin on his arms.         Review of Systems    Objective   /82   Pulse 55   Temp 36.3 °C (97.4 °F)   Ht 1.727 m (5' 8\")   Wt 73.5 kg (162 lb)   SpO2 97%   BMI 24.63 kg/m²     Physical Exam  Vitals reviewed.   Constitutional:       Appearance: Normal appearance.   HENT:      Head: Normocephalic and atraumatic.   Eyes:      General: No scleral icterus.        Right eye: No discharge.         Left eye: No discharge.      Extraocular Movements: Extraocular movements intact.      Conjunctiva/sclera: Conjunctivae normal.      Pupils: Pupils are equal, round, and reactive to light.   Cardiovascular:      Rate and Rhythm: Normal rate and regular rhythm.      Pulses: Normal pulses.      Heart sounds: Normal heart sounds. No murmur heard.  Pulmonary:      Effort: Pulmonary effort is normal.      Breath sounds: Normal breath sounds. No wheezing or rhonchi.   Musculoskeletal:         General: No deformity or signs of injury. Normal range of motion.      Cervical back: Normal range of motion and neck supple. No rigidity or tenderness.   Lymphadenopathy:      Cervical: No cervical adenopathy.   Skin:     General: Skin is warm and dry.      Findings: No rash.      Comments: He had some dry patches on his arms   Neurological:      General: No focal deficit present.      Mental Status: He is alert and oriented to person, place, and time. Mental status is at baseline.      Cranial Nerves: No cranial nerve deficit.      Sensory: No sensory deficit.      Gait: Gait normal.   Psychiatric:         Mood and Affect: Mood normal.         Behavior: Behavior normal.         Thought Content: Thought content normal.         Judgment: Judgment " normal.         Assessment/Plan   Problem List Items Addressed This Visit             ICD-10-CM    Blood pressure elevated without history of HTN - Primary R03.0    Chronic kidney disease, stage 3a (Multi) N18.31    Dyslipidemia E78.5    Relevant Orders    Lipid Panel    TSH with reflex to Free T4 if abnormal    Basic Metabolic Panel    Alanine Aminotransferase    Hypothyroidism E03.9    Relevant Orders    TSH with reflex to Free T4 if abnormal    Osteoporosis without current pathological fracture M81.0    Prostate cancer (Multi) C61     Other Visit Diagnoses         Codes    Routine general medical examination at health care facility     Z00.00    Essential hypertension with goal blood pressure less than 130/85     I10    Relevant Medications    losartan (Cozaar) 50 mg tablet    Other Relevant Orders    Basic Metabolic Panel    Basic Metabolic Panel    Immunization counseling     Z71.85    Relevant Medications    diphth,pertus,acell,,tetanus (BoostRIX) 2.5-8-5 Lf-mcg-Lf/0.5mL injection    Immunization due     Z23    Relevant Medications    respiratory syncytial virus, RSV, vaccine, adjuvanted, age 60y+ (Arexvy) 120 mcg/0.5 mL suspension for reconstitution             Portions of this encounter note have been copied from my previous note dated  2/12/24 , which have been updated where appropriate and all reflect my current medical decision making from today.     Care planning-his wife Danitza  was in attendance    Living situation-he lives with his wife, since February 2023 at the [facility] in [place], and a senior facility with exercise facilities available as well as various activities with others in different groups with activities there.  They live in a one-story [facility], with a basement where he has a room where he enjoys his music.  He no longer drives.  His son Gene lives nearby.    Dementia with anxiety and panic attacks-with memory concerns slowly advancing over the last several years.   Neuro psych  testing per Dr. Brunson Oct '20 confirmed this. She recommended multiple things, none of which have been done now 5 months out.  At that time, wrote out a list for him to pursue including 1. Neurology consultation 2. Elderly dementia assessment at the [facility]- 3. MRI 4. Occupational driving assessment. After Oct '21 f/u appt w/ Dr. Soto, Aricept started. His wife feels things are a bit better, in part from his concentrating a bit more, and her humor w/ memory lapses. She avoids correcting him in public. He uses [employer] web site and plays his guitar and sings 2 hrs daily.        8/23-memory loss has progressed somewhat, with behavioral symptoms mainly anxiousness and panic attacks with stressful situations.  They visited with Dr. Soto last week-who suggested low-dose Lexapro along with the hydroxyzine.  We spoke at length regarding the as needed hydroxyzine plan, and considering a 12-week trial of the daily Lexapro to help as a maintenance medicine to help with anxiousness.  Up to this point he has been reluctant to consider the low-dose daily Lexapro but states he will try this for 8 to 12 weeks.  He will continue his exercise routine with swimming daily and time with his music in his basement office room, where he enjoys researching and singing music          2/24-he will continue to take his medication and will see his neurologist each October.  He is no longer driving.  Hydroxyzine refilled but fortunately he has not needed this for anxiousness episodes recently             7/24-doing well presently.  He remains active with an exercise routine.  Scheduled to see Dr. Soto in November and May,  semiannually.  Looking forward towards a [place] cruise with her grandson later this summer.          Driving concerns- I encouraged him to exchange his 's license at the [facility] for a state ID card.           2/24-he is no longer driving-he did not pass his driving test.  He has a state ID card now.      Hypertension-. Home blood pressure check has been brought in and is fairly accurate. They will continue to follow and notify me if over 130.              2/24-blood pressure elevated-he will start low-dose losartan and check a BMP in 2 weeks.  His wife will follow his blood pressure in the afternoon, and after dinner and call if the average is not consistently under 140 systolic            7/24-blood pressure improved.  Losartan refilled      mild chronic kidney disease-stage IIIA- stable on August 2021 labs-we will continue to follow             BMP ordered     Dyslipidemia/elevated 10 year cardiac risk assessment- tolerating additional atorvastatin & baby aspirin               2/24-will discontinue the baby aspirin-his wife states that his neurologist had a concern about this            7/24-BMI favorable with 24.6.  Annual lipids ordered.     Exercise routine-he now has a swimming pool both indoor and outdoor at his new development and he swims 1/2-hour daily            Encouraged walking and swimming           7/24-he swims an hour daily at the pool in the complex     Abnormal EKG- stress echo unremarkable 8/18. No active cardiac symptoms     Carotid bruit concerns/family history of CVA- mother- carotid duplex unremarkable July '19.     Hypothyroidism - he'll cont thyroid supplement & thyroid labs at least semiannually. TSH elevated 1/22. Ordered 8/22.     Medication concerns-I suggested a pill minder and work to have all of his pills taken by lunchtime.     Prostate cancer 2006- we'll continue annual PSA each summer. He no longer sees Dr. Langston. PSA to be followed annually.  No present urology symptoms. Most recent PSA 11/23  remains undetectable.              Annual PSA ordered for later this yr.     Family history of colon cancer-he will continue a colonoscopy every 3 years    updated per Dr. Martinez April 2019. Last in 4/22. Now on 5 year surveillance - next in 4/27.     Osteopenia- he will continue calcium  and vitamin D under the direction of his rheumatologist- Fosamax started Oct '21, after Sept '21 DEXA was a bit worse; Doing well each Saturday morning taking this.    transition to Dr. Barnard in 8/22 to determine if he will require shots or continue with Fosamax.             Presently on Prolia-typically scheduled June and December-with infusion, along with blood work.  Anticipate next DEXA in 2024.  He is scheduled to see Dr. Barnard in rheum in 9/24.     Positive MAIK/Raynaud's- His Raynaud's not problematic this winter.  He will continue with rheumatology care     Left shoulder pain - s/p injury w/ overuse fall '18. improved w/ therapy. no longer on meloxicam. Dr. Douglas helped left shoulder w/ yard work.               Improved     Mid back pain - onset approximately late 7/22 after lifting a planters' urn. Currently improved. Encouraged heating and stretching for continued relief. Strongly encouraged caution with such risk-taking behavior     Congestion / epistaxis - Flonase helpful. bleeding mainly a winter issue. encouraged nasal saline, and ENT f/u w/ concerns     Chronic edema/spider veins/stasis pigmentation/Right tibial varicose vein-should add noted distal anterior tibial area. compression hose encouraged w/ travel and extended travel. He will continue salt restriction. He may see Dr. Kelley in Derm surgery. RE options. Asymptomatic        Right ankle eczema / Sun exposure history-he will continue sunscreen especially sailing, a and follow-up with derm. ordered; he uses SPF 50 or 100 on the water. Using moisturizer to right ankle following right ankle treatment recently. Skin Care concerns - encouraged skin care, sun screen when outdoors, and follow up w/ dermatology w/ any concerning skin changes. He now sees Dr. Salcedo annually     Skin care - he'll cont. sun screen, and see Dr. Barillas annually each March.            7/24-discussed dry skin.  He will change from artery Spring soap to unscented Dove, and  use a moisturizer once or twice daily as he swims for an hour daily and the chlorine may be irritating.                 Tinnitus - improved w/ his music. He wears ear protection     Glasses/family history of cataracts-he will continue annual visits with his eye doctor- Dr. Christiansen annually               They will see him in February.    Left lower eyelid irritation-he will continue the fish oil following the doxycycline course, and use compresses.  He appears to have a lower mid lid cyst.  No irritation today.  They will [place] back with the eye doctor as needed     Early cataracts / reading glasses / vision care / retinal freckle - annually exams cont.             They will continue to see Dr. Chrisitansen each fall     Dental care - semiannually- Dr. Perez - [facility]        Family concerns - daughter in law in [place] had colectomy for colon cancer surgery. in 2020. Doing better           Their son, along w/ his family, visited from [place] earlier this summer.  They hosted the 7 in their new condo.        High dose flu shot encouraged annually after Labor Day- updated fall '23     Shingrix series completed     Covid series completed 3/21. Covid Booster completed. Additional booster shot updated fall '23    Tdap booster suggested 7/24    RSV vaccination suggested 7/24     Follow-up in 4-5 months, sooner as needed     Charting was completed using voice recognition technology and may include unintended errors.

## 2024-07-16 NOTE — RESULT ENCOUNTER NOTE
Benedicto / Danitza    Thank you for coming in yesterday.  It was great to talk with both you.  Also thanks for doing the labs.  I am glad the kidney lab, the creatinine has improved a bit from last time and the thyroid lab looks normal.  We will continue to follow these labs semiannually.    Wishing you both happy and healthy summertime.    Sincerely,  Miguel A Casanova MD

## 2024-08-15 ENCOUNTER — TELEMEDICINE (OUTPATIENT)
Dept: PRIMARY CARE | Facility: CLINIC | Age: 84
End: 2024-08-15
Payer: MEDICARE

## 2024-08-15 ENCOUNTER — TELEPHONE (OUTPATIENT)
Dept: PRIMARY CARE | Facility: CLINIC | Age: 84
End: 2024-08-15

## 2024-08-15 VITALS
DIASTOLIC BLOOD PRESSURE: 82 MMHG | TEMPERATURE: 99.8 F | BODY MASS INDEX: 24.55 KG/M2 | SYSTOLIC BLOOD PRESSURE: 144 MMHG | HEIGHT: 68 IN | HEART RATE: 80 BPM | WEIGHT: 162 LBS

## 2024-08-15 DIAGNOSIS — U07.1 RESPIRATORY TRACT INFECTION DUE TO COVID-19 VIRUS: Primary | ICD-10-CM

## 2024-08-15 DIAGNOSIS — J98.8 RESPIRATORY TRACT INFECTION DUE TO COVID-19 VIRUS: Primary | ICD-10-CM

## 2024-08-15 PROCEDURE — 1159F MED LIST DOCD IN RCRD: CPT | Performed by: INTERNAL MEDICINE

## 2024-08-15 PROCEDURE — 1160F RVW MEDS BY RX/DR IN RCRD: CPT | Performed by: INTERNAL MEDICINE

## 2024-08-15 PROCEDURE — 1123F ACP DISCUSS/DSCN MKR DOCD: CPT | Performed by: INTERNAL MEDICINE

## 2024-08-15 PROCEDURE — 1036F TOBACCO NON-USER: CPT | Performed by: INTERNAL MEDICINE

## 2024-08-15 PROCEDURE — 99213 OFFICE O/P EST LOW 20 MIN: CPT | Performed by: INTERNAL MEDICINE

## 2024-08-15 PROCEDURE — 1157F ADVNC CARE PLAN IN RCRD: CPT | Performed by: INTERNAL MEDICINE

## 2024-08-15 RX ORDER — FLUTICASONE PROPIONATE 50 MCG
SPRAY, SUSPENSION (ML) NASAL
Qty: 16 G | Refills: 3 | Status: SHIPPED | OUTPATIENT
Start: 2024-08-15

## 2024-08-15 ASSESSMENT — PATIENT HEALTH QUESTIONNAIRE - PHQ9
SUM OF ALL RESPONSES TO PHQ9 QUESTIONS 1 AND 2: 0
2. FEELING DOWN, DEPRESSED OR HOPELESS: NOT AT ALL
2. FEELING DOWN, DEPRESSED OR HOPELESS: NOT AT ALL
1. LITTLE INTEREST OR PLEASURE IN DOING THINGS: NOT AT ALL
1. LITTLE INTEREST OR PLEASURE IN DOING THINGS: NOT AT ALL
SUM OF ALL RESPONSES TO PHQ9 QUESTIONS 1 AND 2: 0

## 2024-08-15 NOTE — TELEPHONE ENCOUNTER
Pt tested positive for covid this morning - mild symptoms - fever, cough, fatigue - would like to know what Dr Casanova recommends, taking paxlovid or not - please advise

## 2024-08-15 NOTE — TELEPHONE ENCOUNTER
"8/15-  I called informed wife that Dr. Casanova suggested   \"wants patient to go to the ER and call squad because not getting out of bed since yesterday is not normal..\"    Pt wife stated he just got up, he's doing ok, doesn't want to call squad.  She stated his fever broke.    She just wonder if rx could be called in.    "

## 2024-08-15 NOTE — PROGRESS NOTES
"Subjective   Patient ID: Randy Lewis is a 83 y.o. male who presents for Covid-19 Home Monitoring Video Visit (He tested positive for covid this morning, fever 99.8, cough, fatigue, runny nose,  he is sleeping a lot.).    Virtual or Telephone Consent    An interactive audio and video telecommunication system which permits real time communications between the patient (at the originating site) and provider (at the distant site) was utilized to provide this telehealth service.   Verbal consent was requested and obtained from Randy Lewis on this date, 08/15/24 for a telehealth visit.     Patient became ill this morning with low-grade fever-found a bit difficult to get out of bed.  His wife was helping with the visit and cell phone.  They were on a cruise last week-they flew home Sunday night from 10 PM, getting home at home Monday morning 6 AM.  He went to bed but woke up somewhat tired.  Last evening he started coughing and this morning had troubles getting out of bed.  Temperature 100.1.  Somewhat fatigued.  No nausea vomiting first known COVID illness         Review of Systems    Objective   /82 (BP Location: Left arm, Patient Position: Sitting)   Pulse 80   Temp 37.7 °C (99.8 °F) (Skin)   Ht 1.727 m (5' 8\")   Wt 73.5 kg (162 lb)   BMI 24.63 kg/m²     Physical Exam  Vitals reviewed.   Constitutional:       Appearance: Normal appearance.      Comments: Well-appearing adult in no distress, alert, appearance at baseline  Virtual exam showed patient to be alert active and otherwise appropriate  Spoke normally, with speech at baseline, without evidence of respiratory distress  Facial exam, unremarkable with no obvious eye findings  Skin exam without any obvious rash or notable findings  Mental status exam-appropriate without any worrisome findings, with normal mood and thought content     Neurological:      Mental Status: He is alert.         Assessment/Plan   Problem List Items Addressed This Visit  "   None  Visit Diagnoses         Codes    Respiratory tract infection due to COVID-19 virus    -  Primary U07.1, J98.8    Relevant Medications    nirmatrelvir-ritonavir (PAXLOVID) 300 mg (150 mg x 2)-100 mg tablet therapy pack    fluticasone (Flonase) 50 mcg/actuation nasal spray          Encouraged spouse to write down instructions to optimize compliance    COVID respiratory illness-reviewed measures to help with supportive care.  Specifically encouraged ample fluids to restore hydration and to prevent further dehydration.  Tylenol extra strength, 2 pills 3 times daily for 3 to 4 days, then as needed encouraged.  Flonase nasal spray, 2 sprays twice daily for 2 to 3 weeks till postnasal drip congestion and cough clear.  Antiviral ordered per protocol.  Paxlovid to be used-he will not take the cholesterol medicine while on this finally, encouraged patient to call with any concerns or questions    His wife will test for COVID if she develops COVID symptoms.  She took a test this morning it was negative.    He understands that he cannot get a COVID booster for 3 months.    He should get a flu shot next month    They will call with any concerns    RE Quarantining;     Prior Guidance: The previous COVID-19 guidance recommended a minimum isolation period of 5 days plus a period of post-isolation precautions and was created during the public health emergency with lower population immunity, fewer tools to combat respiratory viruses, and higher rates of severe illness, including hospitalizations and deaths.    Updated Guidance: Since March 1, 2024, the updated Respiratory Virus Guidance recommends that people stay home and away from others until at least 24 hours after both their symptoms are getting better overall, and they have not had a fever (and are not using fever-reducing medication). Note that depending on the length of symptoms, this period could be shorter, the same, or longer than the previous guidance for  COVID-19.    It is important to note that the guidance doesn’t end with staying home and away from others when sick. The guidance encourages added precaution over the next five days after time at home, away from others, is over. Since some people remain contagious beyond the “stay-at-home” period, a period of added precaution using prevention strategies, such as taking more steps for  air, enhancing hygiene practices, wearing a well-fitting mask, keeping a distance from others, and/or getting tested for respiratory viruses can lower the chance of spreading respiratory viruses to others.

## 2024-08-15 NOTE — TELEPHONE ENCOUNTER
Danitza called back stating she is concerned about Bill tested positive for covid, he hasn't gotten out of bed since 9 o'clock last night.      Please call pt at  [phone]

## 2024-08-15 NOTE — TELEPHONE ENCOUNTER
Danitza called back, saw someone from the office tried to call regarding her . Please call her back at: [phone].

## 2024-08-23 ENCOUNTER — TELEPHONE (OUTPATIENT)
Dept: PRIMARY CARE | Facility: CLINIC | Age: 84
End: 2024-08-23
Payer: MEDICARE

## 2024-08-23 DIAGNOSIS — J98.01 COUGH DUE TO BRONCHOSPASM: Primary | ICD-10-CM

## 2024-08-23 RX ORDER — PREDNISONE 10 MG/1
TABLET ORAL
Qty: 20 TABLET | Refills: 0 | Status: SHIPPED | OUTPATIENT
Start: 2024-08-23

## 2024-08-23 NOTE — TELEPHONE ENCOUNTER
Pt tested positive for covid 8 days ago. Pt is still testing positive with some changes of symptoms. Chest congestion, cough, sneezing.     Pt wife does not know how to treat  [phone]

## 2024-08-23 NOTE — TELEPHONE ENCOUNTER
Spoke with spouse regarding patient's persistent cough during the day more so than at night.  He is on the Flonase twice daily now day 8.  Discussed possibility of bronchospasm after his COVID.  Will use a prednisone taper as well as adding Mucinex DM twice daily she will continue having push fluids.  He is otherwise comfortable.  She will stop testing for COVID as he is 8 days out and it is unclear whether he will actually convert back or not.  If he does leave quarantine after 10 days, he needs to leave with a mask if he still is coughing or congested she will call with additional concerns otherwise have him follow-up as scheduled

## 2024-09-25 ENCOUNTER — APPOINTMENT (OUTPATIENT)
Dept: RHEUMATOLOGY | Facility: CLINIC | Age: 84
End: 2024-09-25
Payer: MEDICARE

## 2024-09-25 VITALS
DIASTOLIC BLOOD PRESSURE: 73 MMHG | SYSTOLIC BLOOD PRESSURE: 131 MMHG | WEIGHT: 163 LBS | BODY MASS INDEX: 24.78 KG/M2 | HEART RATE: 59 BPM

## 2024-09-25 DIAGNOSIS — M81.8 AGE-RELATED OSTEOPOROSIS WITHOUT FRACTURE: Primary | ICD-10-CM

## 2024-09-25 PROCEDURE — 1157F ADVNC CARE PLAN IN RCRD: CPT | Performed by: INTERNAL MEDICINE

## 2024-09-25 PROCEDURE — 1123F ACP DISCUSS/DSCN MKR DOCD: CPT | Performed by: INTERNAL MEDICINE

## 2024-09-25 PROCEDURE — 99213 OFFICE O/P EST LOW 20 MIN: CPT | Performed by: INTERNAL MEDICINE

## 2024-09-25 PROCEDURE — 1159F MED LIST DOCD IN RCRD: CPT | Performed by: INTERNAL MEDICINE

## 2024-09-25 PROCEDURE — 1036F TOBACCO NON-USER: CPT | Performed by: INTERNAL MEDICINE

## 2024-09-25 NOTE — PROGRESS NOTES
He and his wife contracted a COVID virus infection in 8/2024.  He had a positive COVID virus test on 8/15/2024.  He was treated with a prednisone taper 8/23/2024 for recurrent coughing.  He denies any musculoskeletal pain, gastrointestinal symptoms, dysphagia, or any planned invasive dental procedures.  He has not noted any complications from the Prolia injections.  The most recent Prolia injection was 6/6/2024 but the next Prolia injection planned for 12/6/2024.    He has a thin body habitus.  The lungs, heart, abdomen, and extremities are benign.  The musculoskeletal examination does not show any synovitis.    DEXA bone density T-score..  (9/10/2021)..  (3/6/2018)  Left femoral neck...........................-2.6................... -1.9  Left total femur...............................-1.9................... -1.8  L1-L4.................................................. -1.2..................... 0.2    He has history of hypothyroidism, renal insufficiency, hypertension, status post treatment for prostate cancer, Raynaud's phenomena, osteoporosis for which she has been treated with Prolia 60 mg subcutaneously every 6 months for four injections.    He is to continue with plans for Prolia injection.  He is to have laboratory for comprehensive metabolic panel prior to the next Prolia injection.  He is to have repeat DEXA bone density study to assess for effectiveness of the Prolia therapy.  He is to return at the next available office appointment.    Note: The previous bone mineral density study was done at [facility] [address], [place] [id number], telephone # [phone].

## 2024-09-27 DIAGNOSIS — M85.80 OSTEOPENIA, UNSPECIFIED LOCATION: Primary | ICD-10-CM

## 2024-10-04 ENCOUNTER — HOSPITAL ENCOUNTER (OUTPATIENT)
Dept: RADIOLOGY | Facility: CLINIC | Age: 84
Discharge: HOME | End: 2024-10-04
Payer: MEDICARE

## 2024-10-04 DIAGNOSIS — M81.8 AGE-RELATED OSTEOPOROSIS WITHOUT FRACTURE: ICD-10-CM

## 2024-10-04 PROCEDURE — 77080 DXA BONE DENSITY AXIAL: CPT

## 2024-10-10 DIAGNOSIS — E03.9 ACQUIRED HYPOTHYROIDISM: ICD-10-CM

## 2024-10-10 RX ORDER — LEVOTHYROXINE SODIUM 50 UG/1
50 TABLET ORAL
Qty: 90 TABLET | Refills: 1 | Status: SHIPPED | OUTPATIENT
Start: 2024-10-10

## 2024-11-08 ENCOUNTER — APPOINTMENT (OUTPATIENT)
Dept: NEUROLOGY | Facility: CLINIC | Age: 84
End: 2024-11-08
Payer: MEDICARE

## 2024-11-08 VITALS
HEART RATE: 51 BPM | HEIGHT: 68 IN | WEIGHT: 166.8 LBS | BODY MASS INDEX: 25.28 KG/M2 | TEMPERATURE: 96.2 F | DIASTOLIC BLOOD PRESSURE: 80 MMHG | SYSTOLIC BLOOD PRESSURE: 132 MMHG

## 2024-11-08 DIAGNOSIS — F41.1 GENERALIZED ANXIETY DISORDER: ICD-10-CM

## 2024-11-08 DIAGNOSIS — G30.9 MODERATE ALZHEIMER'S DEMENTIA, UNSPECIFIED TIMING OF DEMENTIA ONSET, UNSPECIFIED WHETHER BEHAVIORAL, PSYCHOTIC, OR MOOD DISTURBANCE OR ANXIETY: Primary | ICD-10-CM

## 2024-11-08 DIAGNOSIS — Z71.89 ADVANCED CARE PLANNING/COUNSELING DISCUSSION: ICD-10-CM

## 2024-11-08 DIAGNOSIS — F02.B0 MODERATE ALZHEIMER'S DEMENTIA, UNSPECIFIED TIMING OF DEMENTIA ONSET, UNSPECIFIED WHETHER BEHAVIORAL, PSYCHOTIC, OR MOOD DISTURBANCE OR ANXIETY: Primary | ICD-10-CM

## 2024-11-08 DIAGNOSIS — E78.5 DYSLIPIDEMIA: ICD-10-CM

## 2024-11-08 PROBLEM — R41.3 MEMORY CHANGES: Status: RESOLVED | Noted: 2024-01-03 | Resolved: 2024-11-08

## 2024-11-08 PROBLEM — F41.9 ANXIOUSNESS: Status: RESOLVED | Noted: 2023-07-19 | Resolved: 2024-11-08

## 2024-11-08 PROBLEM — R60.9 EDEMA: Status: RESOLVED | Noted: 2023-07-19 | Resolved: 2024-11-08

## 2024-11-08 PROCEDURE — 1036F TOBACCO NON-USER: CPT | Performed by: STUDENT IN AN ORGANIZED HEALTH CARE EDUCATION/TRAINING PROGRAM

## 2024-11-08 PROCEDURE — 1159F MED LIST DOCD IN RCRD: CPT | Performed by: STUDENT IN AN ORGANIZED HEALTH CARE EDUCATION/TRAINING PROGRAM

## 2024-11-08 PROCEDURE — 1123F ACP DISCUSS/DSCN MKR DOCD: CPT | Performed by: STUDENT IN AN ORGANIZED HEALTH CARE EDUCATION/TRAINING PROGRAM

## 2024-11-08 PROCEDURE — 1160F RVW MEDS BY RX/DR IN RCRD: CPT | Performed by: STUDENT IN AN ORGANIZED HEALTH CARE EDUCATION/TRAINING PROGRAM

## 2024-11-08 PROCEDURE — 99497 ADVNCD CARE PLAN 30 MIN: CPT | Performed by: STUDENT IN AN ORGANIZED HEALTH CARE EDUCATION/TRAINING PROGRAM

## 2024-11-08 PROCEDURE — 1157F ADVNC CARE PLAN IN RCRD: CPT | Performed by: STUDENT IN AN ORGANIZED HEALTH CARE EDUCATION/TRAINING PROGRAM

## 2024-11-08 PROCEDURE — G2211 COMPLEX E/M VISIT ADD ON: HCPCS | Performed by: STUDENT IN AN ORGANIZED HEALTH CARE EDUCATION/TRAINING PROGRAM

## 2024-11-08 PROCEDURE — 99214 OFFICE O/P EST MOD 30 MIN: CPT | Performed by: STUDENT IN AN ORGANIZED HEALTH CARE EDUCATION/TRAINING PROGRAM

## 2024-11-08 ASSESSMENT — LIFESTYLE VARIABLES
HOW MANY STANDARD DRINKS CONTAINING ALCOHOL DO YOU HAVE ON A TYPICAL DAY: PATIENT DOES NOT DRINK
HOW OFTEN DO YOU HAVE A DRINK CONTAINING ALCOHOL: NEVER
SKIP TO QUESTIONS 9-10: 1
AUDIT-C TOTAL SCORE: 0
HOW OFTEN DO YOU HAVE SIX OR MORE DRINKS ON ONE OCCASION: NEVER

## 2024-11-08 ASSESSMENT — PATIENT HEALTH QUESTIONNAIRE - PHQ9
1. LITTLE INTEREST OR PLEASURE IN DOING THINGS: NOT AT ALL
SUM OF ALL RESPONSES TO PHQ9 QUESTIONS 1 & 2: 0
2. FEELING DOWN, DEPRESSED OR HOPELESS: NOT AT ALL

## 2024-11-08 NOTE — PROGRESS NOTES
[facility]   Referring: No ref. provider found  PCP: Miguel A Casanova MD  Chief Complaint   Patient presents with    Alzheimer's Disease       Subjective   Randy Lewis is a 84 y.o. year old male presenting for visit for follow-up .     He was last seen 5/9/2024. He has moderate stage dementia. He is no longer driving as he did not pass his OT or BMV evaluation. He is no longer sailing or operating a boat independent. But he was previously a part-time  and sail racer.     He is on escitalopram 5mg daily. He is having some anxiety regarding a broken guitar. He previously played a few hours per day. His son borrowed the electric guitar and did not bring it back yet and his acoustic guitar won't stay in tune and needs his son to fix it. His wife reports he is watching too much TV.     He goes to [employer] sometimes with his wife on a daily basis. They belong to [employer] but also go to [employer]. They were swimming every day June and July. They went to [place] in August, he did fine on the trip. They got COVID on their way home and they were both sick for 3.5 weeks. He was treated with steroids and was able to recover a little faster than his wife. But this limited their ability to exercise while they were recovering.     He was referred for an OT driving evaluation, he was recommended to CEASE driving. He is no longer driving as he did not pass the BMV exam either. He has a state ID now.     Of note, he has a PhD in clinical and school psychology from [employer]. 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     Patient Active Problem List   Diagnosis    Adenomatous colon polyp    Anemia    Anxiousness    Blood pressure elevated without history of HTN    Chronic kidney disease, stage 3a (Multi)    Moderate Alzheimer's dementia, unspecified timing of dementia onset, unspecified whether behavioral, psychotic, or mood  "disturbance or anxiety    Dyslipidemia    Edema    Hypothyroidism    Osteopenia    Osteoporosis without current pathological fracture    Pain in joint of left shoulder    Prostate cancer (Multi)    Raynauds phenomenon    Stasis pigmentation    Varicose veins of leg with swelling    Weakness of shoulder    Wears glasses    Driving safety issue    Age-related nuclear cataract of both eyes    Asteroid hyalosis of right eye    Choroidal nevus of left eye    Dizziness and giddiness    Generalized hyperhidrosis       Objective   Neurological Exam  Mental Status  Awake and alert. Oriented only to person and place. Speech is normal.  Does not know the month (could not identify end of sailing season)  Oriented to \"hospital\" .    Cranial Nerves  CN III, IV, VI: Extraocular movements intact bilaterally.  CN VII: Full and symmetric facial movement.  CN VIII: Hearing is normal.    Motor   Strength is 5/5 throughout all four extremities.    Gait  Casual gait is normal including stance, stride, and arm swing.    Physical Exam  Constitutional:       General: He is awake.   Eyes:      Extraocular Movements: Extraocular movements intact.   Neurological:      Mental Status: He is alert.      Motor: Motor strength is normal.  Psychiatric:         Speech: Speech normal.       Assessment/Plan   Diagnoses and all orders for this visit:  Moderate Alzheimer's dementia, unspecified timing of dementia onset, unspecified whether behavioral, psychotic, or mood disturbance or anxiety    Moderate stage Alzheimer's dementia: Previous MMSE 20/30. No longer driving. Lives with his wife but is functionally independent. Continue donepezil 10mg daily. Not a candidate for Leqembi due to moderate stages. Encouraged to restart regular physical exercise. We discussed advanced care planning and I confirmed that he is a DNR confirmed with his wife.   Generalized anxiety disorder: doing well on escitalopram 5mg daily. Son to help his guitars as when he plays " music this gives him something to do and helps with his anxiety     Follow-up in 6 months     There are no Patient Instructions on file for this visit.

## 2024-11-11 RX ORDER — ATORVASTATIN CALCIUM 20 MG/1
20 TABLET, FILM COATED ORAL DAILY
Qty: 90 TABLET | Refills: 3 | Status: SHIPPED | OUTPATIENT
Start: 2024-11-11

## 2024-11-15 ENCOUNTER — TELEPHONE (OUTPATIENT)
Dept: NEUROLOGY | Facility: CLINIC | Age: 84
End: 2024-11-15
Payer: MEDICARE

## 2024-11-15 DIAGNOSIS — R41.3 MEMORY LOSS: ICD-10-CM

## 2024-11-15 RX ORDER — DONEPEZIL HYDROCHLORIDE 10 MG/1
10 TABLET, FILM COATED ORAL NIGHTLY
Qty: 90 TABLET | Refills: 3 | Status: SHIPPED | OUTPATIENT
Start: 2024-11-15 | End: 2025-11-15

## 2024-11-15 NOTE — PROGRESS NOTES
Refill of donepezil 10mg daily sent to pharmacy  Is also on escitalopram which can contribute to QT prolongation  EKG 2/12/2024 had Qtc 407ms   Recommend annual EKG to monitor Qtc

## 2024-11-21 DIAGNOSIS — F41.9 ANXIETY: ICD-10-CM

## 2024-11-21 RX ORDER — ESCITALOPRAM OXALATE 5 MG/1
5 TABLET ORAL DAILY
Qty: 90 TABLET | Refills: 3 | Status: SHIPPED | OUTPATIENT
Start: 2024-11-21 | End: 2025-11-21

## 2024-11-26 ENCOUNTER — LAB (OUTPATIENT)
Dept: LAB | Facility: LAB | Age: 84
End: 2024-11-26
Payer: MEDICARE

## 2024-11-26 DIAGNOSIS — E78.5 DYSLIPIDEMIA: ICD-10-CM

## 2024-11-26 DIAGNOSIS — M85.80 OSTEOPENIA, UNSPECIFIED LOCATION: ICD-10-CM

## 2024-11-26 DIAGNOSIS — I10 ESSENTIAL HYPERTENSION WITH GOAL BLOOD PRESSURE LESS THAN 130/85: ICD-10-CM

## 2024-11-26 DIAGNOSIS — U07.1 RESPIRATORY TRACT INFECTION DUE TO COVID-19 VIRUS: ICD-10-CM

## 2024-11-26 DIAGNOSIS — C61 PROSTATE CANCER (MULTI): ICD-10-CM

## 2024-11-26 DIAGNOSIS — J98.8 RESPIRATORY TRACT INFECTION DUE TO COVID-19 VIRUS: ICD-10-CM

## 2024-11-26 LAB
ALBUMIN SERPL BCP-MCNC: 4.1 G/DL (ref 3.4–5)
ALP SERPL-CCNC: 82 U/L (ref 33–136)
ALT SERPL W P-5'-P-CCNC: 22 U/L (ref 10–52)
ANION GAP SERPL CALC-SCNC: 9 MMOL/L (ref 10–20)
AST SERPL W P-5'-P-CCNC: 22 U/L (ref 9–39)
BILIRUB SERPL-MCNC: 0.7 MG/DL (ref 0–1.2)
BUN SERPL-MCNC: 26 MG/DL (ref 6–23)
CALCIUM SERPL-MCNC: 9.4 MG/DL (ref 8.6–10.3)
CHLORIDE SERPL-SCNC: 107 MMOL/L (ref 98–107)
CHOLEST SERPL-MCNC: 131 MG/DL (ref 0–199)
CHOLESTEROL/HDL RATIO: 2.5
CO2 SERPL-SCNC: 32 MMOL/L (ref 21–32)
CREAT SERPL-MCNC: 1.39 MG/DL (ref 0.5–1.3)
EGFRCR SERPLBLD CKD-EPI 2021: 51 ML/MIN/1.73M*2
GLUCOSE SERPL-MCNC: 93 MG/DL (ref 74–99)
HDLC SERPL-MCNC: 51.9 MG/DL
LDLC SERPL CALC-MCNC: 61 MG/DL
NON HDL CHOLESTEROL: 79 MG/DL (ref 0–149)
POTASSIUM SERPL-SCNC: 4.5 MMOL/L (ref 3.5–5.3)
PROT SERPL-MCNC: 6.8 G/DL (ref 6.4–8.2)
PSA SERPL-MCNC: <0.01 NG/ML
SODIUM SERPL-SCNC: 143 MMOL/L (ref 136–145)
TRIGL SERPL-MCNC: 90 MG/DL (ref 0–149)
TSH SERPL-ACNC: 2.47 MIU/L (ref 0.44–3.98)
VLDL: 18 MG/DL (ref 0–40)

## 2024-11-26 PROCEDURE — 80053 COMPREHEN METABOLIC PANEL: CPT

## 2024-11-26 PROCEDURE — 84443 ASSAY THYROID STIM HORMONE: CPT

## 2024-11-26 PROCEDURE — 84153 ASSAY OF PSA TOTAL: CPT

## 2024-11-26 PROCEDURE — 80061 LIPID PANEL: CPT

## 2024-11-26 PROCEDURE — 36415 COLL VENOUS BLD VENIPUNCTURE: CPT

## 2024-11-27 RX ORDER — FLUTICASONE PROPIONATE 50 MCG
SPRAY, SUSPENSION (ML) NASAL
Qty: 48 ML | Refills: 2 | Status: SHIPPED | OUTPATIENT
Start: 2024-11-27

## 2024-11-27 NOTE — RESULT ENCOUNTER NOTE
Results reviewed. No urgent findings.  Will Review results in detail at upcoming office appointment scheduled soon.      Miguel A Casanova MD

## 2024-12-06 ENCOUNTER — APPOINTMENT (OUTPATIENT)
Dept: INFUSION THERAPY | Facility: CLINIC | Age: 84
End: 2024-12-06
Payer: MEDICARE

## 2024-12-06 VITALS
SYSTOLIC BLOOD PRESSURE: 129 MMHG | RESPIRATION RATE: 16 BRPM | TEMPERATURE: 97.8 F | HEART RATE: 54 BPM | OXYGEN SATURATION: 97 % | DIASTOLIC BLOOD PRESSURE: 76 MMHG

## 2024-12-06 DIAGNOSIS — M85.80 OSTEOPENIA, UNSPECIFIED LOCATION: ICD-10-CM

## 2024-12-06 RX ORDER — FAMOTIDINE 10 MG/ML
20 INJECTION INTRAVENOUS ONCE AS NEEDED
OUTPATIENT
Start: 2025-05-25

## 2024-12-06 RX ORDER — ALBUTEROL SULFATE 0.83 MG/ML
3 SOLUTION RESPIRATORY (INHALATION) AS NEEDED
OUTPATIENT
Start: 2025-05-25

## 2024-12-06 RX ORDER — EPINEPHRINE 0.3 MG/.3ML
0.3 INJECTION SUBCUTANEOUS EVERY 5 MIN PRN
OUTPATIENT
Start: 2025-05-25

## 2024-12-06 RX ORDER — DIPHENHYDRAMINE HYDROCHLORIDE 50 MG/ML
50 INJECTION INTRAMUSCULAR; INTRAVENOUS AS NEEDED
OUTPATIENT
Start: 2025-05-25

## 2024-12-06 ASSESSMENT — ENCOUNTER SYMPTOMS
GASTROINTESTINAL NEGATIVE: 1
MUSCULOSKELETAL NEGATIVE: 1
ENDOCRINE NEGATIVE: 1
CONSTITUTIONAL NEGATIVE: 1
EYES NEGATIVE: 1
RESPIRATORY NEGATIVE: 1
NEUROLOGICAL NEGATIVE: 1
CARDIOVASCULAR NEGATIVE: 1
HEMATOLOGIC/LYMPHATIC NEGATIVE: 1

## 2024-12-06 ASSESSMENT — PAIN SCALES - GENERAL: PAINLEVEL_OUTOF10: 0-NO PAIN

## 2024-12-06 NOTE — PROGRESS NOTES
[facility]   Infusion Clinic Note   Date: 2024   Name: Randy Lewis  : 1940   MRN: [id number]          Reason for Visit: Injections (60 mg Prolia Injection)         Today: We administered denosumab.       Visit Type: INJECTION       Ordered By: Ricardo Barnard MD       Accompanied by:Wife       Diagnosis: Osteopenia, unspecified location        Allergies:   Allergies as of 2024    (No Known Allergies)          Current Medications has a current medication list which includes the following prescription(s): atorvastatin, calcium carbonate, cholecalciferol, denosumab, donepezil, escitalopram, fluticasone, hydroxyzine hcl, levothyroxine, losartan, and multivitamin.       Vitals:   There were no vitals filed for this visit.          Infusion Pre-procedure Checklist:   - Allergies reviewed: yes   - Medications reviewed: yes       - Previous reaction to current treatment: no      Assess patient for the concerns below. Document provider notification as appropriate.  - Active or recent infection with/without current antibiotic use: no  - Recent or planned invasive dental work: no  - Recent or planned surgeries: no  - Recently received or plans to receive vaccinations: no  - Has treatment related toxicities: no  - Is pregnant:  no      Pain: 0   - Is the pain different from normal: no   - Is your Doctor aware:  n/a       Labs: N/A          Fall Risk Screening: Saab Fall Risk  History of Falling, Immediate or Within 3 Months: No  Secondary Diagnosis: No  Ambulatory Aid: Walks without aid/bedrest/nurse assist  Intravenous Therapy/Heparin Lock: No  Gait/Transferring: Normal/bedrest/immobile  Mental Status: Oriented to own ability  Saab Fall Risk Score: 0       Review Of Systems:  Review of Systems   Constitutional: Negative.    HENT:  Negative.     Eyes: Negative.    Respiratory: Negative.     Cardiovascular: Negative.    Gastrointestinal: Negative.    Endocrine: Negative.     Genitourinary: Negative.     Musculoskeletal: Negative.    Skin: Negative.    Neurological: Negative.    Hematological: Negative.    Psychiatric/Behavioral:          Na         ROS completed? Yes      Infusion Readiness:  - Assessment Concerns Related to Infusion: No  - Provider notified: n/a      Document Below Only If Indicated:   New Patient Education:    N/A (returning patient for continuation of therapy. Ongoing education provided as needed.)        Treatment Conditions & Drug Specific Questions:    Denosumab  (PROLIA. XGEVA)    (Unless otherwise specified on patient specific therapy plan):     TREATMENT CONDITIONS:  Unless otherwise specified on patient specific therapy plan HOLD and notify provider prior to proceeding with today's injection if patients:  o Calcium is LESS THAN 8.6 mg/dL OR  Ionized Calcium LESS THAN 1.1 mmol/L  o Recent or planned invasive dental procedure (within 4 weeks)    Lab Results   Component Value Date    CALCIUM 9.4 11/26/2024      Lab Results   Component Value Date    CAION 1.26 12/05/2022       Labs reviewed and patient meets treatment conditions? Yes    DRUG SPECIFIC QUESTIONS:  Is the patient taking calcium and vitamin D? Yes  (Recommended)    Pt Instructed on following risks: (1) hypocalcemia, (2) osteonecrosis of the jaw, (3) atypical femoral fractures, (4) serious infections, and (5) dermatologic reactions?  Yes      REMINDER:  PREGNANCY CATEGORY X DRUG. OBTAIN NEGTATIVE PREGNANCY TEST PRIOR TO FIRST INFUSION FOR WOMEN OF CHILDBEARING ABILITY   REMS DRUG    Recommended Vitals/Observation:  Vitals: Obtain vitals prior to injection.  Observation: Patient may leave immediately following injection.        Weight Based Drug Calculations:    WEIGHT BASED DRUGS: NOT APPLICABLE / FLAT DOSE          Initiated By: Sultana Saenz RN

## 2024-12-06 NOTE — PATIENT INSTRUCTIONS
Today :We administered denosumab.     For:   1. Osteopenia, unspecified location         Your next appointment is due in:  6 months        Please read the  Medication Guide that was given to you and reviewed during todays visit.     (Tell all doctors including dentists that you are taking this medication)     Go to the emergency room or call 911 if:  -You have signs of allergic reaction:   -Rash, hives, itching.   -Swollen, blistered, peeling skin.   -Swelling of face, lips, mouth, tongue or throat.   -Tightness of chest, trouble breathing, swallowing or talking     Call your doctor:  - If IV / injection site gets red, warm, swollen, itchy or leaks fluid or pus.     (Leave dressing on your IV site for at least 2 hours and keep area clean and dry  - If you get sick or have symptoms of infection or are not feeling well for any reason.    (Wash your hands often, stay away from people who are sick)  - If you have side effects from your medication that do not go away or are bothersome.     (Refer to the teaching your nurse gave you for side effects to call your doctor about)    - Common side effects may include:  stuffy nose, headache, feeling tired, muscle aches, upset stomach  - Before receiving any vaccines     - Call the [facility] at   If:  - You get sick, are on antibiotics, have had a recent vaccine, have surgery or dental work and your doctor wants your visit rescheduled.  - You need to cancel and reschedule your visit for any reason. Call at least 2 days before your visit if you need to cancel.   - Your insurance changes before your next visit.    (We will need to get approval from your new insurance. This can take up to two weeks.)     The [facility] is opened Monday thru Friday. We are closed on weekends and holidays.   Voice mail will take your call if the center is closed. If you leave a message please allow 24 hours for a call back during weekdays. If you leave a message on  a weekend/holiday, we will call you back the next business day.    A pharmacist is available Monday - Friday from 8:30AM to 3:30PM to help answer any questions you may have about your prescriptions(s). Please call pharmacy at:    [facility]: [phone]  [facility]: [phone]  [facility]: [phone]

## 2024-12-12 ENCOUNTER — APPOINTMENT (OUTPATIENT)
Dept: PRIMARY CARE | Facility: CLINIC | Age: 84
End: 2024-12-12
Payer: MEDICARE

## 2024-12-12 VITALS
BODY MASS INDEX: 25.4 KG/M2 | SYSTOLIC BLOOD PRESSURE: 128 MMHG | HEIGHT: 68 IN | DIASTOLIC BLOOD PRESSURE: 80 MMHG | HEART RATE: 60 BPM | WEIGHT: 167.6 LBS | OXYGEN SATURATION: 100 %

## 2024-12-12 DIAGNOSIS — E78.5 DYSLIPIDEMIA: ICD-10-CM

## 2024-12-12 DIAGNOSIS — N18.31 CHRONIC KIDNEY DISEASE, STAGE 3A (MULTI): ICD-10-CM

## 2024-12-12 DIAGNOSIS — E03.9 ACQUIRED HYPOTHYROIDISM: ICD-10-CM

## 2024-12-12 DIAGNOSIS — Z00.00 WELL ADULT EXAM: Primary | ICD-10-CM

## 2024-12-12 DIAGNOSIS — G30.9 MODERATE ALZHEIMER'S DEMENTIA, UNSPECIFIED TIMING OF DEMENTIA ONSET, UNSPECIFIED WHETHER BEHAVIORAL, PSYCHOTIC, OR MOOD DISTURBANCE OR ANXIETY: ICD-10-CM

## 2024-12-12 DIAGNOSIS — I10 ESSENTIAL HYPERTENSION WITH GOAL BLOOD PRESSURE LESS THAN 130/85: ICD-10-CM

## 2024-12-12 DIAGNOSIS — F41.1 GENERALIZED ANXIETY DISORDER: ICD-10-CM

## 2024-12-12 DIAGNOSIS — H25.13 AGE-RELATED NUCLEAR CATARACT OF BOTH EYES: ICD-10-CM

## 2024-12-12 DIAGNOSIS — M81.0 OSTEOPOROSIS WITHOUT CURRENT PATHOLOGICAL FRACTURE, UNSPECIFIED OSTEOPOROSIS TYPE: ICD-10-CM

## 2024-12-12 DIAGNOSIS — F02.B0 MODERATE ALZHEIMER'S DEMENTIA, UNSPECIFIED TIMING OF DEMENTIA ONSET, UNSPECIFIED WHETHER BEHAVIORAL, PSYCHOTIC, OR MOOD DISTURBANCE OR ANXIETY: ICD-10-CM

## 2024-12-12 DIAGNOSIS — C61 PROSTATE CANCER (MULTI): ICD-10-CM

## 2024-12-12 PROCEDURE — 1157F ADVNC CARE PLAN IN RCRD: CPT | Performed by: INTERNAL MEDICINE

## 2024-12-12 PROCEDURE — 1160F RVW MEDS BY RX/DR IN RCRD: CPT | Performed by: INTERNAL MEDICINE

## 2024-12-12 PROCEDURE — 1123F ACP DISCUSS/DSCN MKR DOCD: CPT | Performed by: INTERNAL MEDICINE

## 2024-12-12 PROCEDURE — 1159F MED LIST DOCD IN RCRD: CPT | Performed by: INTERNAL MEDICINE

## 2024-12-12 PROCEDURE — 3079F DIAST BP 80-89 MM HG: CPT | Performed by: INTERNAL MEDICINE

## 2024-12-12 PROCEDURE — 3074F SYST BP LT 130 MM HG: CPT | Performed by: INTERNAL MEDICINE

## 2024-12-12 PROCEDURE — 1036F TOBACCO NON-USER: CPT | Performed by: INTERNAL MEDICINE

## 2024-12-12 PROCEDURE — 99397 PER PM REEVAL EST PAT 65+ YR: CPT | Performed by: INTERNAL MEDICINE

## 2024-12-12 ASSESSMENT — PATIENT HEALTH QUESTIONNAIRE - PHQ9
SUM OF ALL RESPONSES TO PHQ9 QUESTIONS 1 AND 2: 0
1. LITTLE INTEREST OR PLEASURE IN DOING THINGS: NOT AT ALL
2. FEELING DOWN, DEPRESSED OR HOPELESS: NOT AT ALL

## 2024-12-16 PROBLEM — Z91.89 DRIVING SAFETY ISSUE: Status: RESOLVED | Noted: 2024-01-03 | Resolved: 2024-12-16

## 2024-12-16 PROBLEM — Z00.00 WELL ADULT EXAM: Status: ACTIVE | Noted: 2024-12-16

## 2024-12-16 PROBLEM — R03.0 BLOOD PRESSURE ELEVATED WITHOUT HISTORY OF HTN: Status: RESOLVED | Noted: 2023-07-19 | Resolved: 2024-12-16

## 2024-12-16 PROBLEM — I10 ESSENTIAL HYPERTENSION WITH GOAL BLOOD PRESSURE LESS THAN 130/85: Status: ACTIVE | Noted: 2024-12-16

## 2025-03-18 ENCOUNTER — APPOINTMENT (OUTPATIENT)
Dept: DERMATOLOGY | Facility: CLINIC | Age: 85
End: 2025-03-18
Payer: MEDICARE

## 2025-03-18 DIAGNOSIS — L57.8 PHOTOAGING OF SKIN: Primary | ICD-10-CM

## 2025-03-18 DIAGNOSIS — D22.5 MELANOCYTIC NEVI OF TRUNK: ICD-10-CM

## 2025-03-18 DIAGNOSIS — D23.9 DERMATOFIBROMA: ICD-10-CM

## 2025-03-18 DIAGNOSIS — L57.0 ACTINIC KERATOSIS: ICD-10-CM

## 2025-03-18 DIAGNOSIS — L81.4 LENTIGO: ICD-10-CM

## 2025-03-18 DIAGNOSIS — Z12.83 ENCOUNTER FOR SCREENING FOR MALIGNANT NEOPLASM OF SKIN: ICD-10-CM

## 2025-03-18 DIAGNOSIS — D18.01 HEMANGIOMA OF SKIN: ICD-10-CM

## 2025-03-18 DIAGNOSIS — D22.71 MELANOCYTIC NEVI OF RIGHT LOWER LIMB, INCLUDING HIP: ICD-10-CM

## 2025-03-18 DIAGNOSIS — D22.70 MELANOCYTIC NEVUS OF LOWER EXTREMITY INCLUDING HIP, UNSPECIFIED LATERALITY: ICD-10-CM

## 2025-03-18 DIAGNOSIS — D48.5 NEOPLASM OF UNCERTAIN BEHAVIOR OF SKIN: ICD-10-CM

## 2025-03-18 PROCEDURE — 17003 DESTRUCT PREMALG LES 2-14: CPT | Performed by: DERMATOLOGY

## 2025-03-18 PROCEDURE — 1123F ACP DISCUSS/DSCN MKR DOCD: CPT | Performed by: DERMATOLOGY

## 2025-03-18 PROCEDURE — 99213 OFFICE O/P EST LOW 20 MIN: CPT | Performed by: DERMATOLOGY

## 2025-03-18 PROCEDURE — 1157F ADVNC CARE PLAN IN RCRD: CPT | Performed by: DERMATOLOGY

## 2025-03-18 PROCEDURE — 11102 TANGNTL BX SKIN SINGLE LES: CPT | Performed by: DERMATOLOGY

## 2025-03-18 PROCEDURE — 17000 DESTRUCT PREMALG LESION: CPT | Performed by: DERMATOLOGY

## 2025-03-18 NOTE — PROGRESS NOTES
Subjective     Randy Lewis is a 84 y.o. male who presents for the following: Skin Check (Annual - LV 03/15/24 - Patient has a history of: Venous stasis dermatitis, DN - 08/28/19, AK, SK - 03/15/24.  Area(s) of concern: none/).   Last visit 3/15/24 - have biopsy that showed SK.    Review of Systems:  No other skin or systemic complaints other than what is documented elsewhere in the note.    The following portions of the chart were reviewed this encounter and updated as appropriate:         Skin Cancer History  No skin cancer on file.      Specialty Problems          Dermatology Problems    Stasis pigmentation        Objective   Well appearing patient in no apparent distress; mood and affect are within normal limits.    A full examination was performed including scalp, head, eyes, ears, nose, lips, neck, chest, axillae, abdomen, back, buttocks, bilateral upper extremities, bilateral lower extremities, hands, feet, fingers, toes, fingernails, and toenails. All findings within normal limits unless otherwise noted below.    Assessment/Plan   1. Photoaging of skin  Mottled pigmentation with telangiectasias and brown reticular macules in sun exposed areas of the body.    The risk of chronic, cumulative sun damage and risk of development of skin cancer was reviewed today.   The importance of sun protection was reviewed: including the use of a broad spectrum sunscreen that protects against both UVA/UVB rays, with ingredients such as Zinc oxide or titanium dioxide, wearing sun protective clothing and sun avoidance. We reviewed the warning signs of non-melanoma skin cancer and ABCDEs of melanoma  Please follow up should you notice any new or changing pre-existing skin lesion.    Related Procedures  Follow Up In Dermatology - Established Patient    2. Neoplasm of uncertain behavior of skin  Right upper chest  4mm pearly papule with brown globules and crust              Lesion biopsy  Type of biopsy: tangential     Informed consent: discussed and consent obtained    Timeout: patient name, date of birth, surgical site, and procedure verified    Procedure prep:  Patient was prepped and draped  Anesthesia: the lesion was anesthetized in a standard fashion    Anesthetic:  1% lidocaine w/ epinephrine 1-100,000 local infiltration  Instrument used: DermaBlade    Hemostasis achieved with: aluminum chloride    Outcome: patient tolerated procedure well    Post-procedure details: sterile dressing applied and wound care instructions given    Dressing type: petrolatum and bandage      Staff Communication: Dermatology Local Anesthesia: 1 % Lidocaine / Epinephrine - Amount:1cc    Specimen 1 - Dermatopathology- DERM LAB  Differential Diagnosis: bcc  Check Margins Yes/No?:    Comments:    Dermpath Lab: Routine Histopathology (formalin-fixed tissue)    Lesion concerning for bcc. The need for biopsy for definitive diagnosis recommended. Risks and benefits reviewed, see procedure note.    3. Actinic keratosis (5)  Dorsum of Nose, Left Superior Helix, Mid Frontal Scalp, Mid Parietal Scalp (2)  Erythematous macules with gritty scale.    Lesions are due to cumulative sun damage over time and are pre-cancerous. They have a risk (although small in majority of patients) of developing into squamous cell carcinoma and therefore treatment recommendations were offered and discussed.   Treatments Discussed include LN2, photodynamic (blue light) therapy & topical chemotherapy creams, risks and benefits of each.     The risks and benefits of LN2 were reviewed including incomplete removal, crusting, blister hypo and/or hyperpigmentation, scarring and recurrence. Pt elected for LN2 today    Destr of lesion - Dorsum of Nose, Left Superior Helix, Mid Frontal Scalp, Mid Parietal Scalp (2)  Complexity: simple    Destruction method: cryotherapy    Informed consent: discussed and consent obtained    Lesion destroyed using liquid nitrogen: Yes    Region frozen until  ice ball extended beyond lesion: Yes    Cryotherapy cycles:  1  Outcome: patient tolerated procedure well with no complications    Post-procedure details: wound care instructions given      4. Dermatofibroma  Right Foot - Anterior  Firm pink papule with hyperpigmented halo, +dimple sign    Benign, scar tissue like growth that most frequently is due to bug bite or ingrown hair. No treatment is necessary.    5. Hemangioma of skin  Cherry red papules    The benign nature of these skin lesions were reviewed, no treatment is necessary.   Please follow up for any new or pre-existing lesion that is changing in size, shape, color, becomes painful, tender, itches or bleed.    6. Melanocytic nevi of trunk  Tan-brown symmetric macules and papules    Clinically benign appearing nevi, no treatment is necessary.  The importance of sun protection was reviewed: including the use of a broad spectrum sunscreen that protects against both UVA/UVB rays, with ingredients such as Zinc oxide or titanium dioxide, wearing sun protective clothing and sun avoidance.   ABCDEs of melanoma reviewed.  Please follow up should you notice any new or changing pre-existing skin lesion.    7. Melanocytic nevi of right lower limb, including hip  Scattered, uniform and benign-appearing, regular brown melanocytic papules and macules.    Clinically benign appearing nevi, no treatment is necessary.  The importance of sun protection was reviewed: including the use of a broad spectrum sunscreen that protects against both UVA/UVB rays, with ingredients such as Zinc oxide or titanium dioxide, wearing sun protective clothing and sun avoidance.   ABCDEs of melanoma reviewed.  Please follow up should you notice any new or changing pre-existing skin lesion.    8. Melanocytic nevus of lower extremity including hip, unspecified laterality  Scattered, uniform and benign-appearing, regular brown melanocytic papules and macules.    Clinically benign appearing nevi, no  treatment is necessary.  The importance of sun protection was reviewed: including the use of a broad spectrum sunscreen that protects against both UVA/UVB rays, with ingredients such as Zinc oxide or titanium dioxide, wearing sun protective clothing and sun avoidance.   ABCDEs of melanoma reviewed.  Please follow up should you notice any new or changing pre-existing skin lesion.    9. Lentigo  Scattered tan macules in sun-exposed areas.    These are benign skin lesions due to sun exposure. They will darken in response to sun exposure. They should be monitored for change in size, shape or color.  These lesions can be treated cosmetically with topical creams, liquid nitrogen and a variety of lasers.    10. Encounter for screening for malignant neoplasm of skin  Lesion of concern on the right upper chest (see NUB)    The risk of chronic, cumulative sun damage and risk of development of skin cancer was reviewed today.   The importance of sun protection was reviewed: including the use of a broad spectrum sunscreen of at least SPF 30 that protects against both UVA/UVB rays, with ingredients such as Zinc oxide or titanium dioxide, wearing sun protective clothing and sun avoidance. We reviewed the warning signs of non-melanoma skin cancer and ABCDEs of melanoma  Please follow up should you notice any new or changing pre-existing skin lesion.    Related Procedures  Follow Up In Dermatology - Established Patient

## 2025-03-20 DIAGNOSIS — C44.519 BASAL CELL CARCINOMA (BCC) OF SKIN OF OTHER PART OF TORSO: Primary | ICD-10-CM

## 2025-03-20 NOTE — RESULT ENCOUNTER NOTE
1st ATTEMPT:  Left message for spouse, Danitza to return call for biopsy results.  Left Dr. Rueda nurse line

## 2025-03-20 NOTE — RESULT ENCOUNTER NOTE
Spouse returned call and was given the result of the shave biopsy results of the Right Upper Chest: BCC.  Per Dr. Barillas an excision is needed.  An appointment was scheduled for Friday, April 11, 25 at 11:30am ([place]) with Dr. Barillas

## 2025-04-02 ENCOUNTER — TELEPHONE (OUTPATIENT)
Dept: RHEUMATOLOGY | Facility: CLINIC | Age: 85
End: 2025-04-02
Payer: MEDICARE

## 2025-04-02 DIAGNOSIS — M81.8 AGE-RELATED OSTEOPOROSIS WITHOUT FRACTURE: ICD-10-CM

## 2025-04-02 NOTE — TELEPHONE ENCOUNTER
Placed call to Danitza, no answer. Left a voicemail that a lab order has been placed in the system for Randy to complete.

## 2025-04-02 NOTE — TELEPHONE ENCOUNTER
Danitza would like to clarify whether  labs are needed for pt's upcoming appt on 4/9. She can be reached for confirmation at [phone]

## 2025-04-09 ENCOUNTER — APPOINTMENT (OUTPATIENT)
Dept: RHEUMATOLOGY | Facility: CLINIC | Age: 85
End: 2025-04-09
Payer: MEDICARE

## 2025-04-09 VITALS
WEIGHT: 170 LBS | HEART RATE: 54 BPM | BODY MASS INDEX: 25.85 KG/M2 | SYSTOLIC BLOOD PRESSURE: 138 MMHG | DIASTOLIC BLOOD PRESSURE: 72 MMHG

## 2025-04-09 DIAGNOSIS — M81.8 AGE-RELATED OSTEOPOROSIS WITHOUT FRACTURE: Primary | ICD-10-CM

## 2025-04-09 PROCEDURE — 1157F ADVNC CARE PLAN IN RCRD: CPT | Performed by: INTERNAL MEDICINE

## 2025-04-09 PROCEDURE — 3078F DIAST BP <80 MM HG: CPT | Performed by: INTERNAL MEDICINE

## 2025-04-09 PROCEDURE — 3075F SYST BP GE 130 - 139MM HG: CPT | Performed by: INTERNAL MEDICINE

## 2025-04-09 PROCEDURE — 99213 OFFICE O/P EST LOW 20 MIN: CPT | Performed by: INTERNAL MEDICINE

## 2025-04-09 PROCEDURE — 1159F MED LIST DOCD IN RCRD: CPT | Performed by: INTERNAL MEDICINE

## 2025-04-09 PROCEDURE — 1123F ACP DISCUSS/DSCN MKR DOCD: CPT | Performed by: INTERNAL MEDICINE

## 2025-04-09 NOTE — PROGRESS NOTES
He presents for follow-up evaluation without any complaints of musculoskeletal pain.  He had a crown placed on one of his teeth recently.  He is not planning on having any invasive dental procedures.  The next Prolia injection is planned for 6//2025.  He has been taking Prolia injections since 12/2022.  He has been less active than he had been previously.  He has not fallen and denies any balance problems.  He is planning on having more scraping of the lesion on the right anterior chest by dermatology for basal cell skin cancer.      He has a thin body habitus.  The lungs, heart, abdomen, and extremities are benign.  There is good passive range of motion of the upper and lower extremity joints without joint effusions.  He is unable to touch his toes with his hands with the knees extended.  There is no tenderness to palpation of the upper or lower extremities or the paraspinous muscles.    DEXA bone density T-score..  (10//2024) (9/10/2021)..  (3/6/2018)  Left femoral neck........................... -1.6............-2.6................... -1.9  Left total femur............................... -1.0............-1.9................... -1.8  L1-L4...................................................... 2.6.......... -1.2..................... 0.2     He has history of osteoporosis that has significantly improved with Prolia injections.  He has history of Raynaud's phenomena, hypothyroidism, renal insufficiency, hypertension, status post treatment for prostate cancer, basal cell skin cancer on right upper anterior chest.    He is to continue with plans for continuing Prolia injections every 6 months for treatment of osteoporosis.  He is to have laboratory for comprehensive metabolic panel prior to the Prolia injections.  He is return for follow-up evaluation in 6 months.

## 2025-04-11 ENCOUNTER — APPOINTMENT (OUTPATIENT)
Dept: DERMATOLOGY | Facility: CLINIC | Age: 85
End: 2025-04-11
Payer: MEDICARE

## 2025-04-11 DIAGNOSIS — C44.519 BASAL CELL CARCINOMA (BCC) OF SKIN OF OTHER PART OF TORSO: ICD-10-CM

## 2025-04-11 DIAGNOSIS — C44.519 BASAL CELL CARCINOMA (BCC) OF SKIN OF TRUNK: Primary | ICD-10-CM

## 2025-04-11 PROCEDURE — 12032 INTMD RPR S/A/T/EXT 2.6-7.5: CPT | Performed by: DERMATOLOGY

## 2025-04-11 PROCEDURE — 11602 EXC TR-EXT MAL+MARG 1.1-2 CM: CPT | Performed by: DERMATOLOGY

## 2025-04-11 NOTE — PROGRESS NOTES
Subjective   Randy Lewis is a 84 y.o. male who presents for the following: Procedure (Excision of Right Upper Chest for BCC) basal cell carcinoma.    The lesion has not been treated previously.    The patient does not have a pacemaker / defibrillator.  The patient is not on blood thinners.    The following portions of the chart were reviewed this encounter and updated as appropriate:         Review of Systems:  No other skin or systemic complaints other than what is documented elsewhere in the note.    Objective   A 0.7 cm scar was noted on the Right upper chest  Scar from biopsy       Well appearing patient in no apparent distress; mood and affect are within normal limits.  Vital signs: See record.    The patient confirmed the identified site.    Assessment/Plan   Basal cell carcinoma (BCC) of skin of trunk  Right upper chest    Staff Communication: Dermatology Local Anesthesia: 1 % Lidocaine / Epinephrine - Amount:8cc    Skin excision    Lesion length (cm):  0.7  Lesion width (cm):  0.7  Margin per side (cm):  0.4  Total excision diameter (cm):  1.5  Informed consent: discussed and consent obtained    Timeout: patient name, date of birth, surgical site, and procedure verified    Procedure prep:  Patient prepped in sterile fashion  Anesthesia: the lesion was anesthetized in a standard fashion    Anesthetic:  1% lidocaine w/ epinephrine 1-100,000 local infiltration  Instrument used: #15 blade    Hemostasis achieved with: electrodesiccation    Outcome: patient tolerated procedure well with no complications      Skin repair  Complexity:  Intermediate  Final length (cm):  6  Informed consent: discussed and consent obtained    Timeout: patient name, date of birth, surgical site, and procedure verified    Procedure prep:  Patient prepped in sterile fashion  Reason for type of repair: reduce tension to allow closure and enhance both functionality and cosmetic results    Undermining: edges could be approximated  without difficulty    Subcutaneous layers (deep stitches):   Suture size:  3-0  Suture type: Monocryl (poliglecaprone 25)    Stitches:  Buried vertical mattress  Fine/surface layer approximation (top stitches):   Suture size:  4-0  Suture type: Monocryl (poliglecaprone 25)    Stitches: running subcuticular    Hemostasis achieved with: suture  Outcome: patient tolerated procedure well with no complications    Post-procedure details: sterile dressing applied and wound care instructions given    Dressing type: pressure dressing (steri strips)      Specimen 1 - Dermatopathology- DERM LAB  Differential Diagnosis: bcc vs scar  Check Margins Yes/No?:  Yes  Comments:  [id number]  Dermpath Lab: Routine Histopathology (formalin-fixed tissue)    Basal cell carcinoma is a type of skin cancer that most frequently occurs secondary to sun exposure.  If left untreated these lesions can grow large, ulcerate the skin and bleed. In very rare circumstances they can spread to other areas of the body.    Treatment is dependent upon the size of the lesion, location and histological subtype.    Risks and benefits of removal include but are not limited to: incomplete removal, infection, bleeding, recurrence, keloid (thickened, itchy or painful scar) formation, wound dehiscence (opening) and need to limit activity for 10-14 days following procedure.        Follow up in 6 months

## 2025-04-12 DIAGNOSIS — E03.9 ACQUIRED HYPOTHYROIDISM: ICD-10-CM

## 2025-04-13 RX ORDER — LEVOTHYROXINE SODIUM 50 UG/1
50 TABLET ORAL
Qty: 90 TABLET | Refills: 1 | Status: SHIPPED | OUTPATIENT
Start: 2025-04-13

## 2025-04-15 LAB
LABORATORY COMMENT REPORT: NORMAL
PATH REPORT.FINAL DX SPEC: NORMAL
PATH REPORT.GROSS SPEC: NORMAL
PATH REPORT.RELEVANT HX SPEC: NORMAL
PATH REPORT.TOTAL CANCER: NORMAL

## 2025-05-21 LAB
ALBUMIN SERPL-MCNC: 4.3 G/DL (ref 3.6–5.1)
ALP SERPL-CCNC: 89 U/L (ref 35–144)
ALT SERPL-CCNC: 24 U/L (ref 9–46)
ANION GAP SERPL CALCULATED.4IONS-SCNC: 8 MMOL/L (CALC) (ref 7–17)
AST SERPL-CCNC: 24 U/L (ref 10–35)
BILIRUB SERPL-MCNC: 0.9 MG/DL (ref 0.2–1.2)
BUN SERPL-MCNC: 20 MG/DL (ref 7–25)
CALCIUM SERPL-MCNC: 9.5 MG/DL (ref 8.6–10.3)
CHLORIDE SERPL-SCNC: 105 MMOL/L (ref 98–110)
CO2 SERPL-SCNC: 28 MMOL/L (ref 20–32)
CREAT SERPL-MCNC: 1.42 MG/DL (ref 0.7–1.22)
EGFRCR SERPLBLD CKD-EPI 2021: 49 ML/MIN/1.73M2
GLUCOSE SERPL-MCNC: 101 MG/DL (ref 65–99)
POTASSIUM SERPL-SCNC: 4.5 MMOL/L (ref 3.5–5.3)
PROT SERPL-MCNC: 7.1 G/DL (ref 6.1–8.1)
SODIUM SERPL-SCNC: 141 MMOL/L (ref 135–146)
TSH SERPL-ACNC: 3.01 MIU/L (ref 0.4–4.5)

## 2025-06-04 ENCOUNTER — APPOINTMENT (OUTPATIENT)
Dept: INFUSION THERAPY | Facility: CLINIC | Age: 85
End: 2025-06-04
Payer: MEDICARE

## 2025-06-04 VITALS
SYSTOLIC BLOOD PRESSURE: 157 MMHG | TEMPERATURE: 97.3 F | HEART RATE: 77 BPM | DIASTOLIC BLOOD PRESSURE: 73 MMHG | OXYGEN SATURATION: 98 % | RESPIRATION RATE: 18 BRPM

## 2025-06-04 DIAGNOSIS — M85.80 OSTEOPENIA, UNSPECIFIED LOCATION: ICD-10-CM

## 2025-06-04 PROCEDURE — 96372 THER/PROPH/DIAG INJ SC/IM: CPT | Performed by: REGISTERED NURSE

## 2025-06-04 RX ORDER — FAMOTIDINE 10 MG/ML
20 INJECTION, SOLUTION INTRAVENOUS ONCE AS NEEDED
OUTPATIENT
Start: 2025-11-21

## 2025-06-04 RX ORDER — ALBUTEROL SULFATE 0.83 MG/ML
3 SOLUTION RESPIRATORY (INHALATION) AS NEEDED
OUTPATIENT
Start: 2025-11-21

## 2025-06-04 RX ORDER — EPINEPHRINE 0.3 MG/.3ML
0.3 INJECTION SUBCUTANEOUS EVERY 5 MIN PRN
OUTPATIENT
Start: 2025-11-21

## 2025-06-04 RX ORDER — DIPHENHYDRAMINE HYDROCHLORIDE 50 MG/ML
50 INJECTION, SOLUTION INTRAMUSCULAR; INTRAVENOUS AS NEEDED
OUTPATIENT
Start: 2025-11-21

## 2025-06-04 ASSESSMENT — ENCOUNTER SYMPTOMS
WHEEZING: 0
CONFUSION: 1
WOUND: 0
LIGHT-HEADEDNESS: 0
NUMBNESS: 0
EXTREMITY WEAKNESS: 0
PALPITATIONS: 0
COUGH: 0
LEG SWELLING: 0
SHORTNESS OF BREATH: 0
DIZZINESS: 0

## 2025-06-04 NOTE — PROGRESS NOTES
[facility]   Infusion Clinic Note   Date: 2025   Name: Randy Lewis  : 1940   MRN: [id number]         Reason for Visit: OP Infusion (PATIENT HERE FOR Q 6 MONTH PROLIA 60 MG INJECTION)         Today: We administered denosumab.       Ordered By: Ricardo Barnard MD       For a Diagnosis of: Osteopenia, unspecified location       At today's visit patient accompanied by: Wife      Today's Vitals:   Vitals:    25 0855   BP: 157/73   Pulse: 77   Resp: 18   Temp: 36.3 °C (97.3 °F)   SpO2: 98%             Pre - Treatment Checklist:      - Previous reaction to current treatment: no      (Assess patient for the concerns below. Document provider notification as appropriate).  - Active or recent infection with/without current antibiotic use: no  - Recent or planned invasive dental work: no  - Recent or planned surgeries: no  - Recently received or plans to receive vaccinations: no  - Has treatment related toxicities: no  - Any chance may be pregnant:  n/a      Pain: 0   - Is the pain different from normal: n/a   - Is prescribing Doctor aware:  n/a      Labs: Reviewed       Fall Risk Screening: Saab Fall Risk  History of Falling, Immediate or Within 3 Months: No  Secondary Diagnosis: Yes  Ambulatory Aid: Walks without aid/bedrest/nurse assist  Intravenous Therapy/Heparin Lock: No  Gait/Transferring: Normal/bedrest/immobile  Mental Status: Forgets limitations  Saab Fall Risk Score: 30       Review Of Systems:  Review of Systems   Respiratory:  Negative for cough, shortness of breath and wheezing.    Cardiovascular:  Negative for chest pain, leg swelling and palpitations.   Skin:  Negative for itching, rash and wound.   Neurological:  Negative for dizziness, extremity weakness, light-headedness and numbness.   Psychiatric/Behavioral:  Positive for confusion (R/T ALZHEIMERS DX).          Infusion Readiness:  - Assessment Concerns Related to Infusion: No  - Provider notified: n/a       New Patient Education:    N/A (returning patient for continuation of therapy. Ongoing education provided as needed.)        Treatment Conditions & Drug Specific Questions:    Denosumab  (PROLIA. XGEVA. STOBOCLO)    (Unless otherwise specified on patient specific therapy plan):     TREATMENT CONDITIONS:  Unless otherwise specified on patient specific therapy plan HOLD and notify provider prior to proceeding with today's injection if patients:  O Corrected or Serum Calcium LESS THAN 8.6 mg/dL  OR Ionized calcium less than 1.1 mmol/L or  less than 4.7 mg/dL (depending on resulting agency)  o Recent or planned invasive dental procedure (within 4 weeks)    Lab Results   Component Value Date    CALCIUM 9.5 05/21/2025      Lab Results   Component Value Date    CAION 1.26 12/05/2022       Patient meets treatment conditions? Yes    DRUG SPECIFIC QUESTIONS:  Is the patient taking calcium and vitamin D? Yes  (Recommended)    Pt Instructed on following risks: (1) hypocalcemia, (2) osteonecrosis of the jaw, (3) atypical femoral fractures, (4) serious infections, and (5) dermatologic reactions?  Yes      REMINDER:  PREGNANCY CATEGORY X DRUG. OBTAIN NEGTATIVE PREGNANCY TEST PRIOR TO FIRST INFUSION FOR WOMEN OF CHILDBEARING ABILITY   REMS DRUG    Recommended Vitals/Observation:  Vitals: Obtain vitals prior to injection.  Observation: Patient may leave immediately following injection.        Weight Based Drug Calculations:    WEIGHT BASED DRUGS: NOT APPLICABLE / FLAT DOSE       Post Treatment: Patient tolerated treatment without issue and was discharged in no apparent distress.      Note Authored / Patient Cared for By: Briseyda Claros RN

## 2025-06-04 NOTE — PATIENT INSTRUCTIONS
Today :We administered denosumab.     For:   1. Osteopenia, unspecified location         Your next appointment is due in:  6 MONTHS        Please read the  Medication Guide that was given to you and reviewed during todays visit.     (Tell all doctors including dentists that you are taking this medication)     Go to the emergency room or call 911 if:  -You have signs of allergic reaction:   -Rash, hives, itching.   -Swollen, blistered, peeling skin.   -Swelling of face, lips, mouth, tongue or throat.   -Tightness of chest, trouble breathing, swallowing or talking     Call your doctor:  - If IV / injection site gets red, warm, swollen, itchy or leaks fluid or pus.     (Leave dressing on your IV site for at least 2 hours and keep area clean and dry  - If you get sick or have symptoms of infection or are not feeling well for any reason.    (Wash your hands often, stay away from people who are sick)  - If you have side effects from your medication that do not go away or are bothersome.     (Refer to the teaching your nurse gave you for side effects to call your doctor about)    - Common side effects may include:  stuffy nose, headache, feeling tired, muscle aches, upset stomach  - Before receiving any vaccines     - Call [facility] at   If:  - You get sick, are on antibiotics, have had a recent vaccine, have surgery or dental work and your doctor wants your visit rescheduled.  - You need to cancel and reschedule your visit for any reason. Call at least 2 days before your visit if you need to cancel.   - Your insurance changes before your next visit.    (We will need to get approval from your new insurance. This can take up to two weeks.)     The [facility] is opened Monday thru Friday. We are closed on weekends and holidays.   Voice mail will take your call if the center is closed. If you leave a message please allow 24 hours for a call back during weekdays. If you leave a message on  a weekend/holiday, we will call you back the next business day.    A pharmacist is available Monday - Friday from 8:30AM to 3:30PM to help answer any questions you may have about your prescriptions(s). Please call pharmacy at:    [facility]: [phone]  [facility]: [phone]  [facility]: [phone]

## 2025-06-16 ENCOUNTER — APPOINTMENT (OUTPATIENT)
Dept: PRIMARY CARE | Facility: CLINIC | Age: 85
End: 2025-06-16
Payer: MEDICARE

## 2025-06-16 VITALS
HEIGHT: 68 IN | HEART RATE: 57 BPM | DIASTOLIC BLOOD PRESSURE: 76 MMHG | SYSTOLIC BLOOD PRESSURE: 114 MMHG | BODY MASS INDEX: 25.73 KG/M2 | OXYGEN SATURATION: 96 % | WEIGHT: 169.8 LBS

## 2025-06-16 DIAGNOSIS — F02.B0 MODERATE ALZHEIMER'S DEMENTIA, UNSPECIFIED TIMING OF DEMENTIA ONSET, UNSPECIFIED WHETHER BEHAVIORAL, PSYCHOTIC, OR MOOD DISTURBANCE OR ANXIETY: ICD-10-CM

## 2025-06-16 DIAGNOSIS — F41.1 GENERALIZED ANXIETY DISORDER: ICD-10-CM

## 2025-06-16 DIAGNOSIS — Z00.00 ROUTINE GENERAL MEDICAL EXAMINATION AT HEALTH CARE FACILITY: ICD-10-CM

## 2025-06-16 DIAGNOSIS — R79.89 ELEVATED SERUM CREATININE: ICD-10-CM

## 2025-06-16 DIAGNOSIS — E55.9 VITAMIN D DEFICIENCY: ICD-10-CM

## 2025-06-16 DIAGNOSIS — Z66 DNR (DO NOT RESUSCITATE): ICD-10-CM

## 2025-06-16 DIAGNOSIS — M81.0 OSTEOPOROSIS WITHOUT CURRENT PATHOLOGICAL FRACTURE, UNSPECIFIED OSTEOPOROSIS TYPE: ICD-10-CM

## 2025-06-16 DIAGNOSIS — Z12.5 SCREENING FOR PROSTATE CANCER: ICD-10-CM

## 2025-06-16 DIAGNOSIS — E03.9 ACQUIRED HYPOTHYROIDISM: ICD-10-CM

## 2025-06-16 DIAGNOSIS — G30.9 MODERATE ALZHEIMER'S DEMENTIA, UNSPECIFIED TIMING OF DEMENTIA ONSET, UNSPECIFIED WHETHER BEHAVIORAL, PSYCHOTIC, OR MOOD DISTURBANCE OR ANXIETY: ICD-10-CM

## 2025-06-16 DIAGNOSIS — E78.5 HYPERLIPIDEMIA, UNSPECIFIED HYPERLIPIDEMIA TYPE: ICD-10-CM

## 2025-06-16 DIAGNOSIS — I10 ESSENTIAL HYPERTENSION WITH GOAL BLOOD PRESSURE LESS THAN 130/85: Primary | ICD-10-CM

## 2025-06-16 DIAGNOSIS — E78.5 DYSLIPIDEMIA: ICD-10-CM

## 2025-06-16 DIAGNOSIS — C61 PROSTATE CANCER (MULTI): ICD-10-CM

## 2025-06-16 DIAGNOSIS — H61.23 BILATERAL IMPACTED CERUMEN: ICD-10-CM

## 2025-06-16 DIAGNOSIS — H91.93 BILATERAL HEARING LOSS, UNSPECIFIED HEARING LOSS TYPE: ICD-10-CM

## 2025-06-16 DIAGNOSIS — D64.9 ANEMIA, UNSPECIFIED TYPE: ICD-10-CM

## 2025-06-16 DIAGNOSIS — N18.31 CHRONIC KIDNEY DISEASE, STAGE 3A (MULTI): ICD-10-CM

## 2025-06-16 PROCEDURE — 3078F DIAST BP <80 MM HG: CPT | Performed by: INTERNAL MEDICINE

## 2025-06-16 PROCEDURE — 99214 OFFICE O/P EST MOD 30 MIN: CPT | Performed by: INTERNAL MEDICINE

## 2025-06-16 PROCEDURE — G0439 PPPS, SUBSEQ VISIT: HCPCS | Performed by: INTERNAL MEDICINE

## 2025-06-16 PROCEDURE — 1159F MED LIST DOCD IN RCRD: CPT | Performed by: INTERNAL MEDICINE

## 2025-06-16 PROCEDURE — 1160F RVW MEDS BY RX/DR IN RCRD: CPT | Performed by: INTERNAL MEDICINE

## 2025-06-16 PROCEDURE — 99397 PER PM REEVAL EST PAT 65+ YR: CPT | Performed by: INTERNAL MEDICINE

## 2025-06-16 PROCEDURE — 1170F FXNL STATUS ASSESSED: CPT | Performed by: INTERNAL MEDICINE

## 2025-06-16 PROCEDURE — 93000 ELECTROCARDIOGRAM COMPLETE: CPT | Performed by: INTERNAL MEDICINE

## 2025-06-16 PROCEDURE — 3074F SYST BP LT 130 MM HG: CPT | Performed by: INTERNAL MEDICINE

## 2025-06-16 PROCEDURE — 1036F TOBACCO NON-USER: CPT | Performed by: INTERNAL MEDICINE

## 2025-06-16 ASSESSMENT — ACTIVITIES OF DAILY LIVING (ADL)
DOING_HOUSEWORK: NEEDS ASSISTANCE
BATHING: INDEPENDENT
ADEQUATE_TO_COMPLETE_ADL: YES
PATIENT'S MEMORY ADEQUATE TO SAFELY COMPLETE DAILY ACTIVITIES?: YES
GROOMING: INDEPENDENT
TOILETING: INDEPENDENT
MANAGING_FINANCES: NEEDS ASSISTANCE
FEEDING YOURSELF: INDEPENDENT
BATHING: INDEPENDENT
TAKING_MEDICATION: NEEDS ASSISTANCE
DRESSING: INDEPENDENT
GROCERY_SHOPPING: NEEDS ASSISTANCE
JUDGMENT_ADEQUATE_SAFELY_COMPLETE_DAILY_ACTIVITIES: YES
ASSISTIVE_DEVICE: EYEGLASSES

## 2025-06-16 ASSESSMENT — PATIENT HEALTH QUESTIONNAIRE - PHQ9
1. LITTLE INTEREST OR PLEASURE IN DOING THINGS: NOT AT ALL
SUM OF ALL RESPONSES TO PHQ9 QUESTIONS 1 AND 2: 0
2. FEELING DOWN, DEPRESSED OR HOPELESS: NOT AT ALL

## 2025-06-16 NOTE — PROGRESS NOTES
"Subjective   Reason for Visit: Randy Lewis is an 84 y.o. male here for a Medicare Wellness visit.     Past Medical, Surgical, and Family History reviewed and updated in chart.    Reviewed all medications by prescribing practitioner or clinical pharmacist (such as prescriptions, OTCs, herbal therapies and supplements) and documented in the medical record.    Here for follow up and wellness visit.  Overall doing well for the most part.    Here with his wife.  Overall doing fairly well.  A bit more issues with word finding.  He remains consistent in his daily routine no issues with anxiousness or anger, she notes that she gets upset about something.    He goes to [employer] every morning, he sings and remembers the words to the [employer] hymns.  He does not play his guitar as much.        Patient Care Team:  Miguel A Casanova MD as PCP - General  Miguel A Casanova MD as PCP - [employer]vantage PCP     Review of Systems    Objective   Vitals:  /76   Pulse 57   Ht 1.734 m (5' 8.27\")   Wt 77 kg (169 lb 12.8 oz)   SpO2 96%   BMI 25.62 kg/m²       Physical Exam  Vitals reviewed.   Constitutional:       Appearance: Normal appearance.   HENT:      Head: Normocephalic and atraumatic.      Right Ear: There is impacted cerumen.      Left Ear: There is impacted cerumen.   Eyes:      General: No scleral icterus.        Right eye: No discharge.         Left eye: No discharge.      Extraocular Movements: Extraocular movements intact.      Conjunctiva/sclera: Conjunctivae normal.      Pupils: Pupils are equal, round, and reactive to light.   Cardiovascular:      Rate and Rhythm: Normal rate and regular rhythm.      Pulses: Normal pulses.      Heart sounds: Normal heart sounds. No murmur heard.  Pulmonary:      Effort: Pulmonary effort is normal.      Breath sounds: Normal breath sounds. No wheezing or rhonchi.   Musculoskeletal:         General: No deformity or signs of injury. Normal range of motion.      Cervical back: " Normal range of motion and neck supple. No rigidity or tenderness.      Right lower leg: Edema present.      Left lower leg: Edema present.      Comments: Trace bilateral nonpitting edema  Pulses easily palpable.  Small abrasion right mid tibial area anteriorly without surrounding cellulitis   Lymphadenopathy:      Cervical: No cervical adenopathy.   Skin:     General: Skin is warm and dry.      Findings: No rash.   Neurological:      General: No focal deficit present.      Mental Status: He is alert and oriented to person, place, and time. Mental status is at baseline.      Cranial Nerves: No cranial nerve deficit.      Sensory: No sensory deficit.      Gait: Gait normal.   Psychiatric:         Mood and Affect: Mood normal.         Behavior: Behavior normal.         Thought Content: Thought content normal.         Judgment: Judgment normal.         Assessment & Plan  Routine general medical examination at health care facility    Orders:    1 Year Follow Up In [facility] - PCP - Wellness Exam; Future    Acquired hypothyroidism         Chronic kidney disease, stage 3a (Multi)    Orders:    Basic Metabolic Panel; Future    CBC; Future    US renal complete; Future    Dyslipidemia         Essential hypertension with goal blood pressure less than 130/85    Orders:    ECG 12 Lead    Osteoporosis without current pathological fracture, unspecified osteoporosis type         Generalized anxiety disorder         Prostate cancer (Multi)    Orders:    Prostate Specific Antigen; Future    Moderate Alzheimer's dementia, unspecified timing of dementia onset, unspecified whether behavioral, psychotic, or mood disturbance or anxiety    Orders:    Referral to Speech Therapy; Future    Anemia, unspecified type         Bilateral hearing loss, unspecified hearing loss type    Orders:    Referral to ENT; Future    Referral to Audiology; Future    Bilateral impacted cerumen    Orders:    Referral to ENT; Future    Referral to  Audiology; Future    Hyperlipidemia, unspecified hyperlipidemia type    Orders:    TSH with reflex to Free T4 if abnormal; Future    Vitamin D deficiency    Orders:    Vitamin D 25-Hydroxy,Total (for eval of Vitamin D levels); Future    Elevated serum creatinine    Orders:    Basic Metabolic Panel; Future    CBC; Future    US renal complete; Future    DNR (do not resuscitate)         Screening for prostate cancer    Orders:    Prostate Specific Antigen; Future            Portions of this encounter note have been copied from my previous note dated  12/12/24 , which have been updated where appropriate and all reflect my current medical decision making from today.     Care planning-his wife Danitza  was in attendance    Labs rev'd from 5/21/25    DNR status -he is a DNR based on discussions from him and his advanced directives    Living situation-he lives with his wife, since February 2023 at the [facility] in [place], and a senior facility with exercise facilities available as well as various activities with others in different groups with activities there.  They live in a one-story condo, with a basement where he has a room where he enjoys his music.  He no longer drives.  His son Gene lives nearby.    Moderate Alzheimer's-dementia with anxiety and panic attacks-with memory concerns slowly advancing over the last several years.   Neuro psych testing per Dr. Brunson Oct '20 confirmed this. She recommended multiple things, none of which have been done now 5 months out.  At that time, wrote out a list for him to pursue including 1. Neurology consultation 2. Elderly dementia assessment at the [facility]- 3. MRI 4. Occupational driving assessment. After Oct '21 f/u appt w/ Dr. Soto, Aricept started. His wife feels things are a bit better, in part from his concentrating a bit more, and her humor w/ memory lapses. She avoids correcting him in public. He uses [employer] web site and plays his guitar and sings 2 hrs  daily.        8/23-memory loss has progressed somewhat, with behavioral symptoms mainly anxiousness and panic attacks with stressful situations.  They visited with Dr. Soto last week-who suggested low-dose Lexapro along with the hydroxyzine.  We spoke at length regarding the as needed hydroxyzine plan, and considering a 12-week trial of the daily Lexapro to help as a maintenance medicine to help with anxiousness.  Up to this point he has been reluctant to consider the low-dose daily Lexapro but states he will try this for 8 to 12 weeks.  He will continue his exercise routine with swimming daily and time with his music in his basement office room, where he enjoys researching and singing music          2/24-he will continue to take his medication and will see his neurologist each October.  He is no longer driving.  Hydroxyzine refilled but fortunately he has not needed this for anxiousness episodes recently             7/24-doing well presently.  He remains active with an exercise routine.  Scheduled to see Dr. Soto in November and May,  semiannually.  Looking forward towards a [employer] cruise with her grandson later this summer.            12/24-remarkably doing fairly well although he has not been doing much from a physical standpoint.  He does enjoy his music, and is able to enjoy family activities along with other friends socially.  Scheduled to follow-up with Dr. Soto in July 2025.  Anxiety fortunately fairly well-controlled presently            6/25-his wife wonders about Prevagen or other dementia supplements.  I told her I do not think these have any they are helpful with dementia issues, but I asked her to review this with Dr Soto, who they are scheduled to see 7/2/2025.  He does not do any computer puzzles and does not do any word searches any longer.  He does not play his guitar as much as he once did. He goes to [employer] every morning, he sings and remembers the words to the [employer] hymns.  I  suggested speech therapy to help with some of the word finding issues to help cognitive changes, as well as exploring activities at the local dementia foundation- the [facility] which includes music therapy and art therapy for him, as well as extended caregiver support sessions monthly for her.          Driving concerns- I encouraged him to exchange his 's license at the [facility] for a state ID card.           2/24-he is no longer driving-he did not pass his driving test.  He has a state ID card now.     Hypertension-. Home blood pressure check has been brought in and is fairly accurate. They will continue to follow and notify me if over 130.              2/24-blood pressure elevated-he will start low-dose losartan and check a BMP in 2 weeks.  His wife will follow his blood pressure in the afternoon, and after dinner and call if the average is not consistently under 140 systolic            7/24-blood pressure improved.  Losartan refilled           12/24-blood pressure acceptable            6/25-      mild chronic kidney disease-stage IIIA- stable on August 2021 labs-we will continue to follow            12/24-creatinine 1.39-stable from 2 years ago             6/25-creatinine 1.43, similar to where it has been before but with his brothers history of kidney cancer-renal ultrasound ordered to make sure that there is no issues or obstructive changes     Dyslipidemia/elevated 10 year cardiac risk assessment- tolerating additional atorvastatin & baby aspirin               2/24-will discontinue the baby aspirin-his wife states that his neurologist had a concern about this            7/24-BMI favorable with 24.6.  Annual lipids ordered.          12/24-LDL 61     Exercise routine-he now has a swimming pool both indoor and outdoor at his new development and he swims 1/2-hour daily            Encouraged walking and swimming           7/24-he swims an hour daily at the pool in the complex           12/24-he has not  been that active recently-encouraged exercising 30 minutes, 4 days weekly-note written out to optimize compliance             6/25-he will continue activities accordingly     Abnormal EKG- stress echo unremarkable 8/18. No active cardiac symptoms     Carotid bruit concerns/family history of CVA- mother- carotid duplex unremarkable July '19.     Hypothyroidism - he'll cont thyroid supplement & thyroid labs at least semiannually.            TSH normal 5/25.  Will continue to track     Medication concerns-I suggested a pill minder and work to have all of his pills taken by lunchtime.     Prostate cancer 2006- we'll continue annual PSA each summer. He no longer sees Dr. Langston. PSA to be followed annually.               No present urology symptoms.               Most recent PSA 11/24  remains undetectable.             6/25-PSA ordered for next time                Family history of colon cancer-he will continue a colonoscopy every 3 years    updated per Dr. Martinez April 2019. Last in 4/22. Now on 5 year surveillance - next in 4/27.     Osteopenia with high risk DEXA- he will continue calcium and vitamin D under the direction of his rheumatologist- Fosamax started Oct '21, after Sept '21 DEXA was a bit worse; Doing well each Saturday morning taking this.    transition to Dr. Barnard in 8/22 to determine if he will require shots or continue with Fosamax.             Presently on Prolia-typically scheduled June and December-with infusion, along with blood work.           12/24-Prolia updated last week-he will repeat in 6 months-and see Dr. Barnard in April 2024.  DEXA 10/24-improved             6/25-anticipating changing to a different agent.  They will see Dr. Barnard in mid October 2025 before the next scheduled Prolia in November     Positive MAIK/Raynaud's- His Raynaud's not problematic this winter.  He will continue with rheumatology care     Left shoulder pain - s/p injury w/ overuse fall '18. improved w/ therapy. no longer  on meloxicam. Dr. Douglas helped left shoulder w/ yard work.               Improved     Mid back pain - onset approximately late 7/22 after lifting a planters' urn. Currently improved. Encouraged heating and stretching for continued relief. Strongly encouraged caution with such risk-taking behavior     Congestion / epistaxis - Flonase helpful. bleeding mainly a winter issue. encouraged nasal saline, and ENT f/u w/ concerns     Chronic edema/spider veins/stasis pigmentation/Right tibial varicose vein-should add noted distal anterior tibial area. compression hose encouraged w/ travel and extended travel. He will continue salt restriction. He may see Dr. Kelley in Derm surgery. RE options. Asymptomatic        Right ankle eczema / Sun exposure history-he will continue sunscreen especially sailing, a and follow-up with derm. ordered; he uses SPF 50 or 100 on the water. Using moisturizer to right ankle following right ankle treatment recently. Skin Care concerns - encouraged skin care, sun screen when outdoors, and follow up w/ dermatology w/ any concerning skin changes. He now sees Dr. Salcedo annually     Skin care - he'll cont. sun screen, and see Dr. Barillas annually each March.            7/24-discussed dry skin.  He will change from artery Spring soap to unscented Dove, and use a moisturizer once or twice daily as he swims for an hour daily and the chlorine may be irritating.           6/25-doing well-to see Dr. Barillas 3/20/26      Cerumen care -referred to ENT 6/25    Hearing concerns-referred to audiology 6/25                 Tinnitus - improved w/ his music. He wears ear protection     Early cataracts glasses/family history of cataracts-he will continue annual visits with his eye doctor- Dr. Christiansen annually            12/24-recent visit in 10/24 completed.  Next planned for 10/25    Left lower eyelid irritation-he will continue the fish oil following the doxycycline course, and use compresses.  He appears to have a  lower mid lid cyst.  No irritation today.  They will [place] back with the eye doctor as needed     Early cataracts / reading glasses / vision care / retinal freckle - annually exams cont.             They will continue to see Dr. Christiansen each fall     Dental care - semiannually- Dr. Perez - [facility]        Family concerns - daughter in law in [place] had colectomy for colon cancer surgery. in 2020. Doing better           Their son, along w/ his family, visited from [place] earlier this summer.  They hosted the 7 in their new condo.        High dose flu shot encouraged annually after Labor Day-   updated September 2024     Covid series completed 3/21. Covid Booster completed. Additional booster shot updated September 2023    Tdap booster suggested 7/24    RSV vaccination  7/24    Shingrix series completed     Follow-up in 4-5 months, sooner as needed     Charting was completed using voice recognition technology and may include unintended errors.

## 2025-06-27 DIAGNOSIS — I10 ESSENTIAL HYPERTENSION WITH GOAL BLOOD PRESSURE LESS THAN 130/85: ICD-10-CM

## 2025-06-27 RX ORDER — LOSARTAN POTASSIUM 50 MG/1
TABLET ORAL
Qty: 90 TABLET | Refills: 3 | Status: SHIPPED | OUTPATIENT
Start: 2025-06-27

## 2025-07-02 ENCOUNTER — APPOINTMENT (OUTPATIENT)
Dept: NEUROLOGY | Facility: CLINIC | Age: 85
End: 2025-07-02
Payer: MEDICARE

## 2025-07-02 VITALS
TEMPERATURE: 97.1 F | BODY MASS INDEX: 25.91 KG/M2 | DIASTOLIC BLOOD PRESSURE: 62 MMHG | HEIGHT: 68 IN | HEART RATE: 60 BPM | WEIGHT: 171 LBS | SYSTOLIC BLOOD PRESSURE: 102 MMHG | RESPIRATION RATE: 12 BRPM

## 2025-07-02 DIAGNOSIS — F02.B0 MODERATE ALZHEIMER'S DEMENTIA, UNSPECIFIED TIMING OF DEMENTIA ONSET, UNSPECIFIED WHETHER BEHAVIORAL, PSYCHOTIC, OR MOOD DISTURBANCE OR ANXIETY: Primary | ICD-10-CM

## 2025-07-02 DIAGNOSIS — G30.9 MODERATE ALZHEIMER'S DEMENTIA, UNSPECIFIED TIMING OF DEMENTIA ONSET, UNSPECIFIED WHETHER BEHAVIORAL, PSYCHOTIC, OR MOOD DISTURBANCE OR ANXIETY: Primary | ICD-10-CM

## 2025-07-02 DIAGNOSIS — F41.1 GENERALIZED ANXIETY DISORDER: ICD-10-CM

## 2025-07-02 PROCEDURE — 1036F TOBACCO NON-USER: CPT | Performed by: STUDENT IN AN ORGANIZED HEALTH CARE EDUCATION/TRAINING PROGRAM

## 2025-07-02 PROCEDURE — 1160F RVW MEDS BY RX/DR IN RCRD: CPT | Performed by: STUDENT IN AN ORGANIZED HEALTH CARE EDUCATION/TRAINING PROGRAM

## 2025-07-02 PROCEDURE — 99214 OFFICE O/P EST MOD 30 MIN: CPT | Performed by: STUDENT IN AN ORGANIZED HEALTH CARE EDUCATION/TRAINING PROGRAM

## 2025-07-02 PROCEDURE — 3074F SYST BP LT 130 MM HG: CPT | Performed by: STUDENT IN AN ORGANIZED HEALTH CARE EDUCATION/TRAINING PROGRAM

## 2025-07-02 PROCEDURE — G2211 COMPLEX E/M VISIT ADD ON: HCPCS | Performed by: STUDENT IN AN ORGANIZED HEALTH CARE EDUCATION/TRAINING PROGRAM

## 2025-07-02 PROCEDURE — 3078F DIAST BP <80 MM HG: CPT | Performed by: STUDENT IN AN ORGANIZED HEALTH CARE EDUCATION/TRAINING PROGRAM

## 2025-07-02 PROCEDURE — 1159F MED LIST DOCD IN RCRD: CPT | Performed by: STUDENT IN AN ORGANIZED HEALTH CARE EDUCATION/TRAINING PROGRAM

## 2025-07-02 RX ORDER — MEMANTINE HYDROCHLORIDE 10 MG/1
TABLET ORAL
Qty: 60 TABLET | Refills: 11 | Status: SHIPPED | OUTPATIENT
Start: 2025-07-02

## 2025-07-02 ASSESSMENT — LIFESTYLE VARIABLES
AUDIT-C TOTAL SCORE: 0
SKIP TO QUESTIONS 9-10: 1
HOW MANY STANDARD DRINKS CONTAINING ALCOHOL DO YOU HAVE ON A TYPICAL DAY: PATIENT DOES NOT DRINK
HOW OFTEN DO YOU HAVE A DRINK CONTAINING ALCOHOL: NEVER
HOW OFTEN DO YOU HAVE SIX OR MORE DRINKS ON ONE OCCASION: NEVER

## 2025-07-02 NOTE — PROGRESS NOTES
"   [facility]   Referring: No ref. provider found  PCP: Miguel A Casanova MD  Chief Complaint   Patient presents with    Alzheimer's Disease     Pts wife states things have been worsening pt states he just moved into a new house but the wife states that was 3 years ago. She states he remembers stuff from way far back but new stuff he wont remember.        Subjective   Randy Lewis is a 84 y.o. year old male presenting for visit for follow-up .     He was last seen 11/8/2024. He is accompanied to clinic by his wife.     He has a history of moderate stage Alzheimer's dementia. Previous MMSE 20/30. No longer driving as he failed his OT and BMV evaluation. Loives with wife, independent in ADLs. On donepezil 10mg daily. Not a candidate for Leqembi due to age and low MMSE. He is on escitalopram 5mg daily for anxiety.     Of note, he has a PhD in clinical and school psychology from [employer]. 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     Since his last visit, he does not understand what his wife is saying. He changes his clothes 2-3 times per day. His wife will pick out his clothes and then she turns around and he will have changed his clothes. He wears a jacket most days as he is cold. They have the house set at 75 degrees. He sleeps more during the day He has an overall good routine. He will shower daily, go to breakfast and [employer] most days. He golf's on Monday, wife drives the cart and keeps score. They also go to the [facility].     He will get upset if his wife says \"no.\" So she is very careful on how she phrases things. And he moves objects around the house a lot. She handed him the car keys one day and then they keys were lost for over 1 week until she found them.     Problem List[1]    Objective   Neurological Exam  Mental Status  Awake and alert. Oriented only to person and time. Speech is normal.  Oriented to July, " "\"your office\" but with prompting he was able to state he was in a Doctor's office, .    Cranial Nerves  CN III, IV, VI: Extraocular movements intact bilaterally.  CN VII: Full and symmetric facial movement.  CN VIII: Hearing is normal.    Motor   Strength is 5/5 throughout all four extremities.    Physical Exam  Constitutional:       General: He is awake.   Eyes:      Extraocular Movements: Extraocular movements intact.   Neurological:      Mental Status: He is alert.      Motor: Motor strength is normal.  Psychiatric:         Speech: Speech normal.       Assessment/Plan   Diagnoses and all orders for this visit:  Moderate Alzheimer's dementia, unspecified timing of dementia onset, unspecified whether behavioral, psychotic, or mood disturbance or anxiety  Generalized anxiety disorder    Moderate stage Alzheimer's dementia: continues to have cognitive decline, now requiring assistance with most iADLs, frequently misplacing objects, and largely requires 24-7 supervision. Some irritability but overall manageable with behavioral redirection. Continue donepezil 10mg daily. Not a candidate for Leqembi. Discussed memantine with patient and his wife, will start memantine 5mg BID and uptitrate to 10mg BID. Has a referral to speech therapy. Discussed safety considerations. He does not drive. Consider medical alert button.   Generalized anxiety: overall stable. Continue escitalopram 5mg daily. Hs hydroxyzine 25mg daily PRN and has not needed, has not had any panic attacks     Follow-up in 6 months or sooner if needed    There are no Patient Instructions on file for this visit.         [1]   Patient Active Problem List  Diagnosis    Adenomatous colon polyp    Anemia    Chronic kidney disease, stage 3a (Multi)    Moderate Alzheimer's dementia, unspecified timing of dementia onset, unspecified whether behavioral, psychotic, or mood disturbance or anxiety    Dyslipidemia    Elevated serum creatinine    Hypothyroidism    Osteopenia "    Osteoporosis without current pathological fracture    Pain in joint of left shoulder    Prostate cancer (Multi)    Raynauds phenomenon    Stasis pigmentation    Varicose veins of leg with swelling    Weakness of shoulder    Wears glasses    Age-related nuclear cataract of both eyes    Asteroid hyalosis of right eye    Choroidal nevus of left eye    Dizziness and giddiness    Generalized hyperhidrosis    Generalized anxiety disorder    Essential hypertension with goal blood pressure less than 130/85    Well adult exam    DNR (do not resuscitate)    Vitamin D deficiency    Bilateral impacted cerumen    Bilateral hearing loss

## 2025-07-07 ENCOUNTER — APPOINTMENT (OUTPATIENT)
Facility: CLINIC | Age: 85
End: 2025-07-07
Payer: MEDICARE

## 2025-07-31 ENCOUNTER — APPOINTMENT (OUTPATIENT)
Dept: AUDIOLOGY | Facility: CLINIC | Age: 85
End: 2025-07-31
Payer: MEDICARE

## 2025-08-02 DIAGNOSIS — U07.1 RESPIRATORY TRACT INFECTION DUE TO COVID-19 VIRUS: ICD-10-CM

## 2025-08-02 DIAGNOSIS — J98.8 RESPIRATORY TRACT INFECTION DUE TO COVID-19 VIRUS: ICD-10-CM

## 2025-08-04 RX ORDER — FLUTICASONE PROPIONATE 50 MCG
SPRAY, SUSPENSION (ML) NASAL
Qty: 48 ML | Refills: 2 | Status: SHIPPED | OUTPATIENT
Start: 2025-08-04

## 2025-08-13 ENCOUNTER — APPOINTMENT (OUTPATIENT)
Facility: CLINIC | Age: 85
End: 2025-08-13
Payer: MEDICARE

## 2025-08-13 VITALS
HEIGHT: 68 IN | DIASTOLIC BLOOD PRESSURE: 78 MMHG | WEIGHT: 171 LBS | SYSTOLIC BLOOD PRESSURE: 169 MMHG | BODY MASS INDEX: 25.91 KG/M2

## 2025-08-13 DIAGNOSIS — H61.23 BILATERAL IMPACTED CERUMEN: Primary | ICD-10-CM

## 2025-08-13 PROCEDURE — 99203 OFFICE O/P NEW LOW 30 MIN: CPT

## 2025-08-13 PROCEDURE — 3078F DIAST BP <80 MM HG: CPT

## 2025-08-13 PROCEDURE — 1160F RVW MEDS BY RX/DR IN RCRD: CPT

## 2025-08-13 PROCEDURE — 3077F SYST BP >= 140 MM HG: CPT

## 2025-08-13 PROCEDURE — 69210 REMOVE IMPACTED EAR WAX UNI: CPT

## 2025-08-13 PROCEDURE — 1036F TOBACCO NON-USER: CPT

## 2025-08-13 PROCEDURE — 1159F MED LIST DOCD IN RCRD: CPT

## 2025-08-19 ENCOUNTER — CLINICAL SUPPORT (OUTPATIENT)
Dept: AUDIOLOGY | Facility: CLINIC | Age: 85
End: 2025-08-19
Payer: MEDICARE

## 2025-08-19 DIAGNOSIS — H90.3 BILATERAL HIGH FREQUENCY SENSORINEURAL HEARING LOSS: Primary | ICD-10-CM

## 2025-08-19 PROCEDURE — 92557 COMPREHENSIVE HEARING TEST: CPT | Performed by: AUDIOLOGIST

## 2025-08-19 PROCEDURE — 92550 TYMPANOMETRY & REFLEX THRESH: CPT | Mod: 52 | Performed by: AUDIOLOGIST

## 2025-08-19 ASSESSMENT — PAIN - FUNCTIONAL ASSESSMENT: PAIN_FUNCTIONAL_ASSESSMENT: 0-10

## 2025-08-19 ASSESSMENT — ENCOUNTER SYMPTOMS: OCCASIONAL FEELINGS OF UNSTEADINESS: 0

## 2025-08-19 ASSESSMENT — PAIN SCALES - GENERAL: PAINLEVEL_OUTOF10: 0 - NO PAIN

## 2025-10-15 ENCOUNTER — APPOINTMENT (OUTPATIENT)
Dept: RHEUMATOLOGY | Facility: CLINIC | Age: 85
End: 2025-10-15
Payer: MEDICARE

## 2025-12-01 ENCOUNTER — APPOINTMENT (OUTPATIENT)
Dept: INFUSION THERAPY | Facility: CLINIC | Age: 85
End: 2025-12-01
Payer: MEDICARE

## 2025-12-22 ENCOUNTER — APPOINTMENT (OUTPATIENT)
Dept: PRIMARY CARE | Facility: CLINIC | Age: 85
End: 2025-12-22
Payer: MEDICARE

## 2026-02-12 ENCOUNTER — APPOINTMENT (OUTPATIENT)
Dept: NEUROLOGY | Facility: CLINIC | Age: 86
End: 2026-02-12
Payer: MEDICARE

## 2026-03-20 ENCOUNTER — APPOINTMENT (OUTPATIENT)
Dept: DERMATOLOGY | Facility: CLINIC | Age: 86
End: 2026-03-20
Payer: MEDICARE

## 2026-06-23 ENCOUNTER — APPOINTMENT (OUTPATIENT)
Dept: PRIMARY CARE | Facility: CLINIC | Age: 86
End: 2026-06-23
Payer: MEDICARE